# Patient Record
Sex: MALE | Race: WHITE | NOT HISPANIC OR LATINO | Employment: OTHER | URBAN - METROPOLITAN AREA
[De-identification: names, ages, dates, MRNs, and addresses within clinical notes are randomized per-mention and may not be internally consistent; named-entity substitution may affect disease eponyms.]

---

## 2018-01-01 ENCOUNTER — APPOINTMENT (OUTPATIENT)
Dept: ULTRASOUND IMAGING | Facility: HOSPITAL | Age: 83
End: 2018-01-01

## 2018-01-01 ENCOUNTER — HOSPITAL ENCOUNTER (INPATIENT)
Facility: HOSPITAL | Age: 83
LOS: 1 days | Discharge: HOSPICE | End: 2018-11-01
Attending: EMERGENCY MEDICINE | Admitting: INTERNAL MEDICINE

## 2018-01-01 ENCOUNTER — APPOINTMENT (OUTPATIENT)
Dept: CT IMAGING | Facility: HOSPITAL | Age: 83
End: 2018-01-01

## 2018-01-01 ENCOUNTER — APPOINTMENT (OUTPATIENT)
Dept: GENERAL RADIOLOGY | Facility: HOSPITAL | Age: 83
End: 2018-01-01

## 2018-01-01 ENCOUNTER — HOSPITAL ENCOUNTER (INPATIENT)
Facility: HOSPITAL | Age: 83
LOS: 3 days | Discharge: NURSING, REHAB OR LONG-TERM CARE | End: 2018-10-08
Attending: EMERGENCY MEDICINE | Admitting: HOSPITALIST

## 2018-01-01 ENCOUNTER — HOSPITAL ENCOUNTER (INPATIENT)
Facility: HOSPITAL | Age: 83
LOS: 1 days | End: 2018-11-01
Attending: INTERNAL MEDICINE | Admitting: INTERNAL MEDICINE

## 2018-01-01 VITALS
WEIGHT: 108 LBS | TEMPERATURE: 98.9 F | HEART RATE: 103 BPM | DIASTOLIC BLOOD PRESSURE: 56 MMHG | RESPIRATION RATE: 20 BRPM | HEIGHT: 64 IN | BODY MASS INDEX: 18.44 KG/M2 | OXYGEN SATURATION: 88 % | SYSTOLIC BLOOD PRESSURE: 116 MMHG

## 2018-01-01 VITALS
BODY MASS INDEX: 18.51 KG/M2 | DIASTOLIC BLOOD PRESSURE: 81 MMHG | OXYGEN SATURATION: 100 % | TEMPERATURE: 97.9 F | HEART RATE: 97 BPM | HEIGHT: 65 IN | WEIGHT: 111.1 LBS | RESPIRATION RATE: 16 BRPM | SYSTOLIC BLOOD PRESSURE: 121 MMHG
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
## 2018-01-01 DIAGNOSIS — G92.8 TOXIC METABOLIC ENCEPHALOPATHY: ICD-10-CM
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
## 2018-01-01 DIAGNOSIS — I50.33 ACUTE ON CHRONIC DIASTOLIC CONGESTIVE HEART FAILURE (HCC): ICD-10-CM

## 2018-01-01 DIAGNOSIS — D64.9 ANEMIA, UNSPECIFIED TYPE: ICD-10-CM

## 2018-01-01 DIAGNOSIS — Z74.2 CAREGIVER NOT READILY AVAILABLE: ICD-10-CM

## 2018-01-01 DIAGNOSIS — S00.11XA CONTUSION OF RIGHT PERIOCULAR REGION, INITIAL ENCOUNTER: ICD-10-CM

## 2018-01-01 DIAGNOSIS — N39.0 ACUTE UTI: Primary | ICD-10-CM

## 2018-01-01 DIAGNOSIS — C61 PRIMARY PROSTATE CANCER WITH METASTASIS FROM PROSTATE TO OTHER SITE (HCC): ICD-10-CM

## 2018-01-01 DIAGNOSIS — N17.9 ACUTE KIDNEY INJURY (HCC): ICD-10-CM

## 2018-01-01 DIAGNOSIS — I48.91 ATRIAL FIBRILLATION WITH RVR (HCC): Primary | ICD-10-CM

## 2018-01-01 LAB
ALBUMIN SERPL-MCNC: 2.9 G/DL (ref 3.5–5.2)
ALBUMIN SERPL-MCNC: 3 G/DL (ref 3.5–5.2)
ALBUMIN/GLOB SERPL: 0.9 G/DL
ALBUMIN/GLOB SERPL: 0.9 G/DL
ALP SERPL-CCNC: 613 U/L (ref 39–117)
ALP SERPL-CCNC: 685 U/L (ref 39–117)
ALT SERPL W P-5'-P-CCNC: 20 U/L (ref 1–41)
ALT SERPL W P-5'-P-CCNC: 21 U/L (ref 1–41)
ANION GAP SERPL CALCULATED.3IONS-SCNC: 12.5 MMOL/L
ANION GAP SERPL CALCULATED.3IONS-SCNC: 13.4 MMOL/L
ANION GAP SERPL CALCULATED.3IONS-SCNC: 14.9 MMOL/L
ANION GAP SERPL CALCULATED.3IONS-SCNC: 16.1 MMOL/L
ANISOCYTOSIS BLD QL: NORMAL
AST SERPL-CCNC: 56 U/L (ref 1–40)
AST SERPL-CCNC: 61 U/L (ref 1–40)
BACTERIA UR QL AUTO: ABNORMAL /HPF
BACTERIA UR QL AUTO: NORMAL /HPF
BASOPHILS # BLD AUTO: 0.01 10*3/MM3 (ref 0–0.2)
BASOPHILS NFR BLD AUTO: 0.1 % (ref 0–1.5)
BASOPHILS NFR BLD AUTO: 0.2 % (ref 0–1.5)
BILIRUB SERPL-MCNC: 0.5 MG/DL (ref 0.1–1.2)
BILIRUB SERPL-MCNC: 0.6 MG/DL (ref 0.1–1.2)
BILIRUB UR QL STRIP: NEGATIVE
BILIRUB UR QL STRIP: NEGATIVE
BUN BLD-MCNC: 125 MG/DL (ref 8–23)
BUN BLD-MCNC: 56 MG/DL (ref 8–23)
BUN BLD-MCNC: 59 MG/DL (ref 8–23)
BUN BLD-MCNC: 65 MG/DL (ref 8–23)
BUN/CREAT SERPL: 18.9 (ref 7–25)
BUN/CREAT SERPL: 20 (ref 7–25)
BUN/CREAT SERPL: 20.4 (ref 7–25)
BUN/CREAT SERPL: 28.7 (ref 7–25)
CALCIUM SPEC-SCNC: 8.7 MG/DL (ref 8.6–10.5)
CALCIUM SPEC-SCNC: 9 MG/DL (ref 8.6–10.5)
CALCIUM SPEC-SCNC: 9.2 MG/DL (ref 8.6–10.5)
CALCIUM SPEC-SCNC: 9.3 MG/DL (ref 8.6–10.5)
CHLORIDE SERPL-SCNC: 101 MMOL/L (ref 98–107)
CHLORIDE SERPL-SCNC: 102 MMOL/L (ref 98–107)
CHLORIDE SERPL-SCNC: 104 MMOL/L (ref 98–107)
CHLORIDE SERPL-SCNC: 105 MMOL/L (ref 98–107)
CK SERPL-CCNC: 49 U/L (ref 20–200)
CLARITY UR: ABNORMAL
CLARITY UR: CLEAR
CO2 SERPL-SCNC: 17.9 MMOL/L (ref 22–29)
CO2 SERPL-SCNC: 20.1 MMOL/L (ref 22–29)
CO2 SERPL-SCNC: 21.5 MMOL/L (ref 22–29)
CO2 SERPL-SCNC: 21.6 MMOL/L (ref 22–29)
COLOR UR: ABNORMAL
COLOR UR: YELLOW
CREAT BLD-MCNC: 2.95 MG/DL (ref 0.76–1.27)
CREAT BLD-MCNC: 2.96 MG/DL (ref 0.76–1.27)
CREAT BLD-MCNC: 3.19 MG/DL (ref 0.76–1.27)
CREAT BLD-MCNC: 4.36 MG/DL (ref 0.76–1.27)
DEPRECATED RDW RBC AUTO: 53.2 FL (ref 37–54)
DEPRECATED RDW RBC AUTO: 53.5 FL (ref 37–54)
DEPRECATED RDW RBC AUTO: 53.8 FL (ref 37–54)
DEPRECATED RDW RBC AUTO: 55.6 FL (ref 37–54)
EOSINOPHIL # BLD AUTO: 0.01 10*3/MM3 (ref 0–0.7)
EOSINOPHIL # BLD AUTO: 0.11 10*3/MM3 (ref 0–0.7)
EOSINOPHIL # BLD AUTO: 0.16 10*3/MM3 (ref 0–0.7)
EOSINOPHIL # BLD AUTO: 0.16 10*3/MM3 (ref 0–0.7)
EOSINOPHIL NFR BLD AUTO: 0.1 % (ref 0.3–6.2)
EOSINOPHIL NFR BLD AUTO: 2.1 % (ref 0.3–6.2)
EOSINOPHIL NFR BLD AUTO: 3.6 % (ref 0.3–6.2)
EOSINOPHIL NFR BLD AUTO: 3.6 % (ref 0.3–6.2)
ERYTHROCYTE [DISTWIDTH] IN BLOOD BY AUTOMATED COUNT: 16.4 % (ref 11.5–14.5)
ERYTHROCYTE [DISTWIDTH] IN BLOOD BY AUTOMATED COUNT: 16.5 % (ref 11.5–14.5)
ERYTHROCYTE [DISTWIDTH] IN BLOOD BY AUTOMATED COUNT: 16.6 % (ref 11.5–14.5)
ERYTHROCYTE [DISTWIDTH] IN BLOOD BY AUTOMATED COUNT: 18.2 % (ref 11.5–14.5)
GFR SERPL CREATININE-BSD FRML MDRD: 13 ML/MIN/1.73
GFR SERPL CREATININE-BSD FRML MDRD: 18 ML/MIN/1.73
GFR SERPL CREATININE-BSD FRML MDRD: 20 ML/MIN/1.73
GFR SERPL CREATININE-BSD FRML MDRD: 20 ML/MIN/1.73
GFR SERPL CREATININE-BSD FRML MDRD: ABNORMAL ML/MIN/1.73
GLOBULIN UR ELPH-MCNC: 3.2 GM/DL
GLOBULIN UR ELPH-MCNC: 3.3 GM/DL
GLUCOSE BLD-MCNC: 123 MG/DL (ref 65–99)
GLUCOSE BLD-MCNC: 144 MG/DL (ref 65–99)
GLUCOSE BLD-MCNC: 83 MG/DL (ref 65–99)
GLUCOSE BLD-MCNC: 90 MG/DL (ref 65–99)
GLUCOSE BLDC GLUCOMTR-MCNC: 101 MG/DL (ref 70–130)
GLUCOSE UR STRIP-MCNC: NEGATIVE MG/DL
GLUCOSE UR STRIP-MCNC: NEGATIVE MG/DL
HBA1C MFR BLD: 5.8 % (ref 4.8–5.6)
HCT VFR BLD AUTO: 26.1 % (ref 40.4–52.2)
HCT VFR BLD AUTO: 26.8 % (ref 40.4–52.2)
HCT VFR BLD AUTO: 27.8 % (ref 40.4–52.2)
HCT VFR BLD AUTO: 28.5 % (ref 40.4–52.2)
HGB BLD-MCNC: 8.2 G/DL (ref 13.7–17.6)
HGB BLD-MCNC: 8.3 G/DL (ref 13.7–17.6)
HGB BLD-MCNC: 8.8 G/DL (ref 13.7–17.6)
HGB BLD-MCNC: 9.1 G/DL (ref 13.7–17.6)
HGB UR QL STRIP.AUTO: ABNORMAL
HGB UR QL STRIP.AUTO: NEGATIVE
HOLD SPECIMEN: NORMAL
HYALINE CASTS UR QL AUTO: ABNORMAL /LPF
HYALINE CASTS UR QL AUTO: NORMAL /LPF
IMM GRANULOCYTES # BLD: 0.04 10*3/MM3 (ref 0–0.03)
IMM GRANULOCYTES # BLD: 0.05 10*3/MM3 (ref 0–0.03)
IMM GRANULOCYTES # BLD: 0.06 10*3/MM3 (ref 0–0.03)
IMM GRANULOCYTES # BLD: 0.06 10*3/MM3 (ref 0–0.03)
IMM GRANULOCYTES NFR BLD: 0.8 % (ref 0–0.5)
IMM GRANULOCYTES NFR BLD: 0.9 % (ref 0–0.5)
IMM GRANULOCYTES NFR BLD: 1.1 % (ref 0–0.5)
IMM GRANULOCYTES NFR BLD: 1.4 % (ref 0–0.5)
INR PPP: 1.66 (ref 0.9–1.1)
KETONES UR QL STRIP: ABNORMAL
KETONES UR QL STRIP: NEGATIVE
LEUKOCYTE ESTERASE UR QL STRIP.AUTO: ABNORMAL
LEUKOCYTE ESTERASE UR QL STRIP.AUTO: NEGATIVE
LYMPHOCYTES # BLD AUTO: 0.42 10*3/MM3 (ref 0.9–4.8)
LYMPHOCYTES # BLD AUTO: 0.51 10*3/MM3 (ref 0.9–4.8)
LYMPHOCYTES # BLD AUTO: 0.56 10*3/MM3 (ref 0.9–4.8)
LYMPHOCYTES # BLD AUTO: 0.59 10*3/MM3 (ref 0.9–4.8)
LYMPHOCYTES NFR BLD AUTO: 11.2 % (ref 19.6–45.3)
LYMPHOCYTES NFR BLD AUTO: 11.5 % (ref 19.6–45.3)
LYMPHOCYTES NFR BLD AUTO: 12.7 % (ref 19.6–45.3)
LYMPHOCYTES NFR BLD AUTO: 6.2 % (ref 19.6–45.3)
MAGNESIUM SERPL-MCNC: 2 MG/DL (ref 1.6–2.4)
MCH RBC QN AUTO: 27.8 PG (ref 27–32.7)
MCH RBC QN AUTO: 28.2 PG (ref 27–32.7)
MCH RBC QN AUTO: 28.2 PG (ref 27–32.7)
MCH RBC QN AUTO: 28.4 PG (ref 27–32.7)
MCHC RBC AUTO-ENTMCNC: 31 G/DL (ref 32.6–36.4)
MCHC RBC AUTO-ENTMCNC: 31.4 G/DL (ref 32.6–36.4)
MCHC RBC AUTO-ENTMCNC: 31.7 G/DL (ref 32.6–36.4)
MCHC RBC AUTO-ENTMCNC: 31.9 G/DL (ref 32.6–36.4)
MCV RBC AUTO: 88.2 FL (ref 79.8–96.2)
MCV RBC AUTO: 89.6 FL (ref 79.8–96.2)
MCV RBC AUTO: 89.7 FL (ref 79.8–96.2)
MCV RBC AUTO: 89.7 FL (ref 79.8–96.2)
MONOCYTES # BLD AUTO: 0.5 10*3/MM3 (ref 0.2–1.2)
MONOCYTES # BLD AUTO: 0.55 10*3/MM3 (ref 0.2–1.2)
MONOCYTES # BLD AUTO: 0.57 10*3/MM3 (ref 0.2–1.2)
MONOCYTES # BLD AUTO: 0.61 10*3/MM3 (ref 0.2–1.2)
MONOCYTES NFR BLD AUTO: 11.6 % (ref 5–12)
MONOCYTES NFR BLD AUTO: 12.4 % (ref 5–12)
MONOCYTES NFR BLD AUTO: 13 % (ref 5–12)
MONOCYTES NFR BLD AUTO: 7.4 % (ref 5–12)
NEUTROPHILS # BLD AUTO: 3.05 10*3/MM3 (ref 1.9–8.1)
NEUTROPHILS # BLD AUTO: 3.2 10*3/MM3 (ref 1.9–8.1)
NEUTROPHILS # BLD AUTO: 3.94 10*3/MM3 (ref 1.9–8.1)
NEUTROPHILS # BLD AUTO: 5.83 10*3/MM3 (ref 1.9–8.1)
NEUTROPHILS NFR BLD AUTO: 69.4 % (ref 42.7–76)
NEUTROPHILS NFR BLD AUTO: 72.3 % (ref 42.7–76)
NEUTROPHILS NFR BLD AUTO: 74.9 % (ref 42.7–76)
NEUTROPHILS NFR BLD AUTO: 86.2 % (ref 42.7–76)
NITRITE UR QL STRIP: NEGATIVE
NITRITE UR QL STRIP: POSITIVE
NRBC BLD MANUAL-RTO: 0 /100 WBC (ref 0–0)
NRBC BLD MANUAL-RTO: 0 /100 WBC (ref 0–0)
NRBC BLD MANUAL-RTO: 0.7 /100 WBC (ref 0–0)
NT-PROBNP SERPL-MCNC: ABNORMAL PG/ML (ref 0–1800)
PH UR STRIP.AUTO: 6 [PH] (ref 5–8)
PH UR STRIP.AUTO: <=5 [PH] (ref 5–8)
PLAT MORPH BLD: NORMAL
PLATELET # BLD AUTO: 46 10*3/MM3 (ref 140–500)
PLATELET # BLD AUTO: 53 10*3/MM3 (ref 140–500)
PLATELET # BLD AUTO: 61 10*3/MM3 (ref 140–500)
PLATELET # BLD AUTO: 65 10*3/MM3 (ref 140–500)
PMV BLD AUTO: 12.9 FL (ref 6–12)
PMV BLD AUTO: ABNORMAL FL (ref 6–12)
POTASSIUM BLD-SCNC: 5 MMOL/L (ref 3.5–5.2)
POTASSIUM BLD-SCNC: 5.1 MMOL/L (ref 3.5–5.2)
POTASSIUM BLD-SCNC: 5.3 MMOL/L (ref 3.5–5.2)
POTASSIUM BLD-SCNC: 5.6 MMOL/L (ref 3.5–5.2)
PROT SERPL-MCNC: 6.2 G/DL (ref 6–8.5)
PROT SERPL-MCNC: 6.2 G/DL (ref 6–8.5)
PROT UR QL STRIP: ABNORMAL
PROT UR QL STRIP: ABNORMAL
PROTHROMBIN TIME: 19.3 SECONDS (ref 11.7–14.2)
RBC # BLD AUTO: 2.91 10*6/MM3 (ref 4.6–6)
RBC # BLD AUTO: 2.99 10*6/MM3 (ref 4.6–6)
RBC # BLD AUTO: 3.1 10*6/MM3 (ref 4.6–6)
RBC # BLD AUTO: 3.23 10*6/MM3 (ref 4.6–6)
RBC # UR: ABNORMAL /HPF
RBC # UR: NORMAL /HPF
REF LAB TEST METHOD: ABNORMAL
REF LAB TEST METHOD: NORMAL
SODIUM BLD-SCNC: 136 MMOL/L (ref 136–145)
SODIUM BLD-SCNC: 136 MMOL/L (ref 136–145)
SODIUM BLD-SCNC: 139 MMOL/L (ref 136–145)
SODIUM BLD-SCNC: 139 MMOL/L (ref 136–145)
SP GR UR STRIP: 1.02 (ref 1–1.03)
SP GR UR STRIP: 1.02 (ref 1–1.03)
SQUAMOUS #/AREA URNS HPF: ABNORMAL /HPF
SQUAMOUS #/AREA URNS HPF: NORMAL /HPF
TROPONIN T SERPL-MCNC: 0.05 NG/ML (ref 0–0.03)
TROPONIN T SERPL-MCNC: 0.07 NG/ML (ref 0–0.03)
TSH SERPL DL<=0.05 MIU/L-ACNC: 1.21 MIU/ML (ref 0.27–4.2)
UROBILINOGEN UR QL STRIP: ABNORMAL
UROBILINOGEN UR QL STRIP: ABNORMAL
VIT B12 BLD-MCNC: 1472 PG/ML (ref 211–946)
WBC MORPH BLD: NORMAL
WBC NRBC COR # BLD: 4.4 10*3/MM3 (ref 4.5–10.7)
WBC NRBC COR # BLD: 4.43 10*3/MM3 (ref 4.5–10.7)
WBC NRBC COR # BLD: 5.26 10*3/MM3 (ref 4.5–10.7)
WBC NRBC COR # BLD: 6.77 10*3/MM3 (ref 4.5–10.7)
WBC UR QL AUTO: ABNORMAL /HPF
WBC UR QL AUTO: NORMAL /HPF
WHOLE BLOOD HOLD SPECIMEN: NORMAL

## 2018-01-01 PROCEDURE — 85025 COMPLETE CBC W/AUTO DIFF WBC: CPT | Performed by: EMERGENCY MEDICINE

## 2018-01-01 PROCEDURE — 25010000002 FUROSEMIDE PER 20 MG: Performed by: EMERGENCY MEDICINE

## 2018-01-01 PROCEDURE — 84484 ASSAY OF TROPONIN QUANT: CPT | Performed by: PHYSICIAN ASSISTANT

## 2018-01-01 PROCEDURE — 25010000002 LORAZEPAM PER 2 MG: Performed by: INTERNAL MEDICINE

## 2018-01-01 PROCEDURE — 25010000002 MORPHINE PER 10 MG: Performed by: INTERNAL MEDICINE

## 2018-01-01 PROCEDURE — 76775 US EXAM ABDO BACK WALL LIM: CPT

## 2018-01-01 PROCEDURE — G0378 HOSPITAL OBSERVATION PER HR: HCPCS

## 2018-01-01 PROCEDURE — 83880 ASSAY OF NATRIURETIC PEPTIDE: CPT | Performed by: EMERGENCY MEDICINE

## 2018-01-01 PROCEDURE — 81001 URINALYSIS AUTO W/SCOPE: CPT | Performed by: EMERGENCY MEDICINE

## 2018-01-01 PROCEDURE — 94799 UNLISTED PULMONARY SVC/PX: CPT

## 2018-01-01 PROCEDURE — 83735 ASSAY OF MAGNESIUM: CPT | Performed by: PHYSICIAN ASSISTANT

## 2018-01-01 PROCEDURE — 85007 BL SMEAR W/DIFF WBC COUNT: CPT | Performed by: PHYSICIAN ASSISTANT

## 2018-01-01 PROCEDURE — 80048 BASIC METABOLIC PNL TOTAL CA: CPT | Performed by: HOSPITALIST

## 2018-01-01 PROCEDURE — 87077 CULTURE AEROBIC IDENTIFY: CPT | Performed by: EMERGENCY MEDICINE

## 2018-01-01 PROCEDURE — 25010000002 MORPHINE PER 10 MG: Performed by: HOSPITALIST

## 2018-01-01 PROCEDURE — 97110 THERAPEUTIC EXERCISES: CPT

## 2018-01-01 PROCEDURE — 85025 COMPLETE CBC W/AUTO DIFF WBC: CPT | Performed by: INTERNAL MEDICINE

## 2018-01-01 PROCEDURE — 83036 HEMOGLOBIN GLYCOSYLATED A1C: CPT | Performed by: INTERNAL MEDICINE

## 2018-01-01 PROCEDURE — 93010 ELECTROCARDIOGRAM REPORT: CPT | Performed by: INTERNAL MEDICINE

## 2018-01-01 PROCEDURE — 87186 SC STD MICRODIL/AGAR DIL: CPT | Performed by: EMERGENCY MEDICINE

## 2018-01-01 PROCEDURE — 97162 PT EVAL MOD COMPLEX 30 MIN: CPT | Performed by: PHYSICAL THERAPIST

## 2018-01-01 PROCEDURE — 93005 ELECTROCARDIOGRAM TRACING: CPT | Performed by: PHYSICIAN ASSISTANT

## 2018-01-01 PROCEDURE — 84443 ASSAY THYROID STIM HORMONE: CPT | Performed by: INTERNAL MEDICINE

## 2018-01-01 PROCEDURE — 84484 ASSAY OF TROPONIN QUANT: CPT | Performed by: INTERNAL MEDICINE

## 2018-01-01 PROCEDURE — 25010000002 HYDROMORPHONE 1 MG/ML SOLUTION: Performed by: INTERNAL MEDICINE

## 2018-01-01 PROCEDURE — 74176 CT ABD & PELVIS W/O CONTRAST: CPT

## 2018-01-01 PROCEDURE — P9612 CATHETERIZE FOR URINE SPEC: HCPCS

## 2018-01-01 PROCEDURE — 99285 EMERGENCY DEPT VISIT HI MDM: CPT

## 2018-01-01 PROCEDURE — 82607 VITAMIN B-12: CPT | Performed by: INTERNAL MEDICINE

## 2018-01-01 PROCEDURE — 80053 COMPREHEN METABOLIC PANEL: CPT | Performed by: EMERGENCY MEDICINE

## 2018-01-01 PROCEDURE — 85610 PROTHROMBIN TIME: CPT | Performed by: PHYSICIAN ASSISTANT

## 2018-01-01 PROCEDURE — 80053 COMPREHEN METABOLIC PANEL: CPT | Performed by: PHYSICIAN ASSISTANT

## 2018-01-01 PROCEDURE — 85025 COMPLETE CBC W/AUTO DIFF WBC: CPT | Performed by: HOSPITALIST

## 2018-01-01 PROCEDURE — 25010000002 CEFTRIAXONE PER 250 MG: Performed by: EMERGENCY MEDICINE

## 2018-01-01 PROCEDURE — 82550 ASSAY OF CK (CPK): CPT | Performed by: PHYSICIAN ASSISTANT

## 2018-01-01 PROCEDURE — 87086 URINE CULTURE/COLONY COUNT: CPT | Performed by: EMERGENCY MEDICINE

## 2018-01-01 PROCEDURE — 85025 COMPLETE CBC W/AUTO DIFF WBC: CPT | Performed by: PHYSICIAN ASSISTANT

## 2018-01-01 PROCEDURE — 25010000002 MORPHINE PER 10 MG

## 2018-01-01 PROCEDURE — 71046 X-RAY EXAM CHEST 2 VIEWS: CPT

## 2018-01-01 PROCEDURE — 81001 URINALYSIS AUTO W/SCOPE: CPT | Performed by: PHYSICIAN ASSISTANT

## 2018-01-01 PROCEDURE — 99284 EMERGENCY DEPT VISIT MOD MDM: CPT

## 2018-01-01 PROCEDURE — 82962 GLUCOSE BLOOD TEST: CPT

## 2018-01-01 PROCEDURE — 70450 CT HEAD/BRAIN W/O DYE: CPT

## 2018-01-01 PROCEDURE — 80048 BASIC METABOLIC PNL TOTAL CA: CPT | Performed by: INTERNAL MEDICINE

## 2018-01-01 PROCEDURE — 97116 GAIT TRAINING THERAPY: CPT | Performed by: PHYSICAL THERAPIST

## 2018-01-01 PROCEDURE — 25010000002 CEFTRIAXONE PER 250 MG: Performed by: HOSPITALIST

## 2018-01-01 RX ORDER — MORPHINE SULFATE 2 MG/ML
2 INJECTION, SOLUTION INTRAMUSCULAR; INTRAVENOUS
Status: DISCONTINUED | OUTPATIENT
Start: 2018-01-01 | End: 2018-01-01 | Stop reason: HOSPADM

## 2018-01-01 RX ORDER — HALOPERIDOL 1 MG/1
0.5 TABLET ORAL 4 TIMES DAILY
Status: DISCONTINUED | OUTPATIENT
Start: 2018-01-01 | End: 2018-01-01 | Stop reason: HOSPADM

## 2018-01-01 RX ORDER — PROMETHAZINE HYDROCHLORIDE 25 MG/1
6.25 TABLET ORAL EVERY 4 HOURS PRN
Status: DISCONTINUED | OUTPATIENT
Start: 2018-01-01 | End: 2018-01-01 | Stop reason: HOSPADM

## 2018-01-01 RX ORDER — PROMETHAZINE HYDROCHLORIDE 25 MG/ML
12.5 INJECTION, SOLUTION INTRAMUSCULAR; INTRAVENOUS EVERY 4 HOURS PRN
Status: DISCONTINUED | OUTPATIENT
Start: 2018-01-01 | End: 2018-01-01 | Stop reason: HOSPADM

## 2018-01-01 RX ORDER — ATENOLOL 25 MG/1
25 TABLET ORAL DAILY
COMMUNITY

## 2018-01-01 RX ORDER — GLYCOPYRROLATE 0.2 MG/ML
0.2 INJECTION INTRAMUSCULAR; INTRAVENOUS
Status: DISCONTINUED | OUTPATIENT
Start: 2018-01-01 | End: 2018-01-01 | Stop reason: HOSPADM

## 2018-01-01 RX ORDER — SODIUM CHLORIDE 9 MG/ML
100 INJECTION, SOLUTION INTRAVENOUS CONTINUOUS
Status: DISCONTINUED | OUTPATIENT
Start: 2018-01-01 | End: 2018-01-01 | Stop reason: HOSPADM

## 2018-01-01 RX ORDER — ONDANSETRON 4 MG/1
4 TABLET, ORALLY DISINTEGRATING ORAL EVERY 6 HOURS PRN
Status: DISCONTINUED | OUTPATIENT
Start: 2018-01-01 | End: 2018-01-01

## 2018-01-01 RX ORDER — ALPRAZOLAM 0.25 MG/1
0.25 TABLET ORAL 2 TIMES DAILY PRN
Status: DISCONTINUED | OUTPATIENT
Start: 2018-01-01 | End: 2018-01-01

## 2018-01-01 RX ORDER — SODIUM CHLORIDE 0.9 % (FLUSH) 0.9 %
1-10 SYRINGE (ML) INJECTION AS NEEDED
Status: CANCELLED | OUTPATIENT
Start: 2018-01-01

## 2018-01-01 RX ORDER — SODIUM CHLORIDE 9 MG/ML
75 INJECTION, SOLUTION INTRAVENOUS CONTINUOUS
Status: DISCONTINUED | OUTPATIENT
Start: 2018-01-01 | End: 2018-01-01

## 2018-01-01 RX ORDER — SENNA AND DOCUSATE SODIUM 50; 8.6 MG/1; MG/1
2 TABLET, FILM COATED ORAL NIGHTLY
Status: DISCONTINUED | OUTPATIENT
Start: 2018-01-01 | End: 2018-01-01 | Stop reason: HOSPADM

## 2018-01-01 RX ORDER — PROMETHAZINE HYDROCHLORIDE 6.25 MG/5ML
6.25 SYRUP ORAL EVERY 4 HOURS PRN
Status: DISCONTINUED | OUTPATIENT
Start: 2018-01-01 | End: 2018-01-01 | Stop reason: HOSPADM

## 2018-01-01 RX ORDER — SCOLOPAMINE TRANSDERMAL SYSTEM 1 MG/1
1 PATCH, EXTENDED RELEASE TRANSDERMAL
Status: CANCELLED | OUTPATIENT
Start: 2018-01-01

## 2018-01-01 RX ORDER — ONDANSETRON 4 MG/1
4 TABLET, FILM COATED ORAL EVERY 6 HOURS PRN
COMMUNITY

## 2018-01-01 RX ORDER — BICALUTAMIDE 50 MG/1
50 TABLET, FILM COATED ORAL DAILY
Status: DISCONTINUED | OUTPATIENT
Start: 2018-01-01 | End: 2018-01-01 | Stop reason: HOSPADM

## 2018-01-01 RX ORDER — SODIUM CHLORIDE 0.9 % (FLUSH) 0.9 %
3 SYRINGE (ML) INJECTION EVERY 12 HOURS SCHEDULED
Status: CANCELLED | OUTPATIENT
Start: 2018-01-01

## 2018-01-01 RX ORDER — ONDANSETRON 4 MG/1
4 TABLET, FILM COATED ORAL EVERY 6 HOURS PRN
Status: DISCONTINUED | OUTPATIENT
Start: 2018-01-01 | End: 2018-01-01 | Stop reason: HOSPADM

## 2018-01-01 RX ORDER — PROMETHAZINE HYDROCHLORIDE 25 MG/ML
6.25 INJECTION, SOLUTION INTRAMUSCULAR; INTRAVENOUS EVERY 4 HOURS PRN
Status: CANCELLED | OUTPATIENT
Start: 2018-01-01

## 2018-01-01 RX ORDER — ACETAMINOPHEN 650 MG/1
650 SUPPOSITORY RECTAL EVERY 4 HOURS PRN
Status: CANCELLED | OUTPATIENT
Start: 2018-01-01

## 2018-01-01 RX ORDER — GLYCOPYRROLATE 0.2 MG/ML
0.2 INJECTION INTRAMUSCULAR; INTRAVENOUS
Status: CANCELLED | OUTPATIENT
Start: 2018-01-01

## 2018-01-01 RX ORDER — AMLODIPINE BESYLATE 5 MG/1
10 TABLET ORAL DAILY
Status: DISCONTINUED | OUTPATIENT
Start: 2018-01-01 | End: 2018-01-01 | Stop reason: HOSPADM

## 2018-01-01 RX ORDER — SODIUM CHLORIDE 0.9 % (FLUSH) 0.9 %
3 SYRINGE (ML) INJECTION EVERY 12 HOURS SCHEDULED
Status: DISCONTINUED | OUTPATIENT
Start: 2018-01-01 | End: 2018-01-01 | Stop reason: HOSPADM

## 2018-01-01 RX ORDER — GLYCOPYRROLATE 0.2 MG/ML
0.4 INJECTION INTRAMUSCULAR; INTRAVENOUS
Status: CANCELLED | OUTPATIENT
Start: 2018-01-01

## 2018-01-01 RX ORDER — ALPRAZOLAM 0.25 MG/1
0.25 TABLET ORAL 2 TIMES DAILY PRN
Qty: 10 TABLET | Refills: 0 | Status: SHIPPED | OUTPATIENT
Start: 2018-01-01

## 2018-01-01 RX ORDER — LORAZEPAM 2 MG/ML
1 INJECTION INTRAMUSCULAR
Status: DISCONTINUED | OUTPATIENT
Start: 2018-01-01 | End: 2018-01-01 | Stop reason: HOSPADM

## 2018-01-01 RX ORDER — ACETAMINOPHEN 160 MG/5ML
650 SOLUTION ORAL EVERY 4 HOURS PRN
Status: DISCONTINUED | OUTPATIENT
Start: 2018-01-01 | End: 2018-01-01 | Stop reason: HOSPADM

## 2018-01-01 RX ORDER — LORAZEPAM 2 MG/ML
1 INJECTION INTRAMUSCULAR
Status: CANCELLED | OUTPATIENT
Start: 2018-01-01 | End: 2018-11-11

## 2018-01-01 RX ORDER — CEFTRIAXONE SODIUM 1 G/50ML
1 INJECTION, SOLUTION INTRAVENOUS ONCE
Status: COMPLETED | OUTPATIENT
Start: 2018-01-01 | End: 2018-01-01

## 2018-01-01 RX ORDER — BICALUTAMIDE 50 MG/1
50 TABLET, FILM COATED ORAL DAILY
COMMUNITY

## 2018-01-01 RX ORDER — ONDANSETRON 2 MG/ML
4 INJECTION INTRAMUSCULAR; INTRAVENOUS EVERY 6 HOURS PRN
Status: DISCONTINUED | OUTPATIENT
Start: 2018-01-01 | End: 2018-01-01 | Stop reason: HOSPADM

## 2018-01-01 RX ORDER — HYDROCODONE BITARTRATE AND ACETAMINOPHEN 5; 325 MG/1; MG/1
1 TABLET ORAL EVERY 4 HOURS PRN
Status: ON HOLD | COMMUNITY
End: 2018-01-01

## 2018-01-01 RX ORDER — HYDRALAZINE HYDROCHLORIDE 25 MG/1
25 TABLET, FILM COATED ORAL 2 TIMES DAILY
COMMUNITY

## 2018-01-01 RX ORDER — ACETAMINOPHEN 160 MG/5ML
650 SOLUTION ORAL EVERY 4 HOURS PRN
Status: CANCELLED | OUTPATIENT
Start: 2018-01-01

## 2018-01-01 RX ORDER — SODIUM CHLORIDE 0.9 % (FLUSH) 0.9 %
10 SYRINGE (ML) INJECTION AS NEEDED
Status: CANCELLED | OUTPATIENT
Start: 2018-01-01

## 2018-01-01 RX ORDER — BICALUTAMIDE 50 MG/1
50 TABLET, FILM COATED ORAL DAILY
Status: DISCONTINUED | OUTPATIENT
Start: 2018-01-01 | End: 2018-01-01

## 2018-01-01 RX ORDER — DIPHENOXYLATE HYDROCHLORIDE AND ATROPINE SULFATE 2.5; .025 MG/1; MG/1
1 TABLET ORAL
Status: CANCELLED | OUTPATIENT
Start: 2018-01-01

## 2018-01-01 RX ORDER — CEFTRIAXONE SODIUM 1 G/50ML
1 INJECTION, SOLUTION INTRAVENOUS EVERY 24 HOURS
Status: DISCONTINUED | OUTPATIENT
Start: 2018-01-01 | End: 2018-01-01

## 2018-01-01 RX ORDER — SODIUM CHLORIDE 0.9 % (FLUSH) 0.9 %
10 SYRINGE (ML) INJECTION AS NEEDED
Status: DISCONTINUED | OUTPATIENT
Start: 2018-01-01 | End: 2018-01-01

## 2018-01-01 RX ORDER — MORPHINE SULFATE 20 MG/ML
5 SOLUTION ORAL
Status: DISCONTINUED | OUTPATIENT
Start: 2018-01-01 | End: 2018-01-01 | Stop reason: HOSPADM

## 2018-01-01 RX ORDER — PROMETHAZINE HYDROCHLORIDE 25 MG/1
12.5 TABLET ORAL EVERY 4 HOURS PRN
Status: DISCONTINUED | OUTPATIENT
Start: 2018-01-01 | End: 2018-01-01 | Stop reason: HOSPADM

## 2018-01-01 RX ORDER — LORAZEPAM 2 MG/ML
2 CONCENTRATE ORAL
Status: CANCELLED | OUTPATIENT
Start: 2018-01-01 | End: 2018-11-11

## 2018-01-01 RX ORDER — PROMETHAZINE HYDROCHLORIDE 12.5 MG/1
12.5 SUPPOSITORY RECTAL EVERY 4 HOURS PRN
Status: DISCONTINUED | OUTPATIENT
Start: 2018-01-01 | End: 2018-01-01 | Stop reason: HOSPADM

## 2018-01-01 RX ORDER — MORPHINE SULFATE 2 MG/ML
2 INJECTION, SOLUTION INTRAMUSCULAR; INTRAVENOUS
Status: CANCELLED | OUTPATIENT
Start: 2018-01-01 | End: 2018-11-11

## 2018-01-01 RX ORDER — LORAZEPAM 2 MG/ML
0.5 INJECTION INTRAMUSCULAR
Status: DISCONTINUED | OUTPATIENT
Start: 2018-01-01 | End: 2018-01-01 | Stop reason: HOSPADM

## 2018-01-01 RX ORDER — MORPHINE SULFATE 2 MG/ML
4 INJECTION, SOLUTION INTRAMUSCULAR; INTRAVENOUS
Status: DISCONTINUED | OUTPATIENT
Start: 2018-01-01 | End: 2018-01-01

## 2018-01-01 RX ORDER — LORAZEPAM 2 MG/ML
1 CONCENTRATE ORAL
Status: DISCONTINUED | OUTPATIENT
Start: 2018-01-01 | End: 2018-01-01 | Stop reason: HOSPADM

## 2018-01-01 RX ORDER — ONDANSETRON 4 MG/1
4 TABLET, ORALLY DISINTEGRATING ORAL EVERY 6 HOURS PRN
Status: DISCONTINUED | OUTPATIENT
Start: 2018-01-01 | End: 2018-01-01 | Stop reason: HOSPADM

## 2018-01-01 RX ORDER — MORPHINE SULFATE 10 MG/ML
4 INJECTION INTRAMUSCULAR; INTRAVENOUS; SUBCUTANEOUS
Status: DISCONTINUED | OUTPATIENT
Start: 2018-01-01 | End: 2018-01-01 | Stop reason: HOSPADM

## 2018-01-01 RX ORDER — FUROSEMIDE 10 MG/ML
40 INJECTION INTRAMUSCULAR; INTRAVENOUS EVERY 12 HOURS
Status: DISCONTINUED | OUTPATIENT
Start: 2018-01-01 | End: 2018-01-01

## 2018-01-01 RX ORDER — DIPHENOXYLATE HYDROCHLORIDE AND ATROPINE SULFATE 2.5; .025 MG/1; MG/1
1 TABLET ORAL
Status: DISCONTINUED | OUTPATIENT
Start: 2018-01-01 | End: 2018-01-01 | Stop reason: HOSPADM

## 2018-01-01 RX ORDER — PROMETHAZINE HYDROCHLORIDE 25 MG/ML
12.5 INJECTION, SOLUTION INTRAMUSCULAR; INTRAVENOUS EVERY 4 HOURS PRN
Status: CANCELLED | OUTPATIENT
Start: 2018-01-01

## 2018-01-01 RX ORDER — MORPHINE SULFATE 2 MG/ML
INJECTION, SOLUTION INTRAMUSCULAR; INTRAVENOUS
Status: COMPLETED
Start: 2018-01-01 | End: 2018-01-01

## 2018-01-01 RX ORDER — HYDRALAZINE HYDROCHLORIDE 25 MG/1
25 TABLET, FILM COATED ORAL 2 TIMES DAILY
Status: DISCONTINUED | OUTPATIENT
Start: 2018-01-01 | End: 2018-01-01 | Stop reason: HOSPADM

## 2018-01-01 RX ORDER — LORAZEPAM 2 MG/ML
0.5 CONCENTRATE ORAL
Status: CANCELLED | OUTPATIENT
Start: 2018-01-01 | End: 2018-11-11

## 2018-01-01 RX ORDER — ACETAMINOPHEN 325 MG/1
650 TABLET ORAL EVERY 4 HOURS PRN
Status: DISCONTINUED | OUTPATIENT
Start: 2018-01-01 | End: 2018-01-01 | Stop reason: HOSPADM

## 2018-01-01 RX ORDER — GLYCOPYRROLATE 0.2 MG/ML
0.4 INJECTION INTRAMUSCULAR; INTRAVENOUS
Status: DISCONTINUED | OUTPATIENT
Start: 2018-01-01 | End: 2018-01-01 | Stop reason: HOSPADM

## 2018-01-01 RX ORDER — LORAZEPAM 2 MG/ML
0.5 CONCENTRATE ORAL
Status: DISCONTINUED | OUTPATIENT
Start: 2018-01-01 | End: 2018-01-01 | Stop reason: HOSPADM

## 2018-01-01 RX ORDER — PROMETHAZINE HYDROCHLORIDE 6.25 MG/5ML
6.25 SYRUP ORAL EVERY 4 HOURS PRN
Status: CANCELLED | OUTPATIENT
Start: 2018-01-01

## 2018-01-01 RX ORDER — ONDANSETRON 4 MG/1
4 TABLET, FILM COATED ORAL EVERY 6 HOURS PRN
Status: DISCONTINUED | OUTPATIENT
Start: 2018-01-01 | End: 2018-01-01

## 2018-01-01 RX ORDER — PROMETHAZINE HYDROCHLORIDE 25 MG/ML
6.25 INJECTION, SOLUTION INTRAMUSCULAR; INTRAVENOUS EVERY 4 HOURS PRN
Status: DISCONTINUED | OUTPATIENT
Start: 2018-01-01 | End: 2018-01-01 | Stop reason: HOSPADM

## 2018-01-01 RX ORDER — FUROSEMIDE 10 MG/ML
60 INJECTION INTRAMUSCULAR; INTRAVENOUS ONCE
Status: COMPLETED | OUTPATIENT
Start: 2018-01-01 | End: 2018-01-01

## 2018-01-01 RX ORDER — ATENOLOL 25 MG/1
25 TABLET ORAL DAILY
Status: DISCONTINUED | OUTPATIENT
Start: 2018-01-01 | End: 2018-01-01 | Stop reason: HOSPADM

## 2018-01-01 RX ORDER — LORAZEPAM 2 MG/ML
1 CONCENTRATE ORAL
Status: CANCELLED | OUTPATIENT
Start: 2018-01-01 | End: 2018-11-11

## 2018-01-01 RX ORDER — ACETAMINOPHEN 325 MG/1
650 TABLET ORAL EVERY 4 HOURS PRN
Status: DISCONTINUED | OUTPATIENT
Start: 2018-01-01 | End: 2018-01-01

## 2018-01-01 RX ORDER — SODIUM CHLORIDE 0.9 % (FLUSH) 0.9 %
10 SYRINGE (ML) INJECTION AS NEEDED
Status: DISCONTINUED | OUTPATIENT
Start: 2018-01-01 | End: 2018-01-01 | Stop reason: HOSPADM

## 2018-01-01 RX ORDER — MORPHINE SULFATE 10 MG/ML
4 INJECTION INTRAMUSCULAR; INTRAVENOUS; SUBCUTANEOUS
Status: CANCELLED | OUTPATIENT
Start: 2018-01-01 | End: 2018-11-11

## 2018-01-01 RX ORDER — SENNA AND DOCUSATE SODIUM 50; 8.6 MG/1; MG/1
2 TABLET, FILM COATED ORAL NIGHTLY
COMMUNITY

## 2018-01-01 RX ORDER — METOPROLOL TARTRATE 5 MG/5ML
5 INJECTION INTRAVENOUS ONCE
Status: COMPLETED | OUTPATIENT
Start: 2018-01-01 | End: 2018-01-01

## 2018-01-01 RX ORDER — BISACODYL 10 MG
10 SUPPOSITORY, RECTAL RECTAL NIGHTLY PRN
COMMUNITY

## 2018-01-01 RX ORDER — LORAZEPAM 2 MG/ML
2 INJECTION INTRAMUSCULAR
Status: CANCELLED | OUTPATIENT
Start: 2018-01-01 | End: 2018-11-11

## 2018-01-01 RX ORDER — HYDROCODONE BITARTRATE AND ACETAMINOPHEN 5; 325 MG/1; MG/1
1 TABLET ORAL EVERY 4 HOURS PRN
Status: DISCONTINUED | OUTPATIENT
Start: 2018-01-01 | End: 2018-01-01 | Stop reason: HOSPADM

## 2018-01-01 RX ORDER — NITROGLYCERIN 0.4 MG/1
0.4 TABLET SUBLINGUAL
Status: DISCONTINUED | OUTPATIENT
Start: 2018-01-01 | End: 2018-01-01 | Stop reason: HOSPADM

## 2018-01-01 RX ORDER — PROMETHAZINE HYDROCHLORIDE 12.5 MG/1
6.25 SUPPOSITORY RECTAL EVERY 4 HOURS PRN
Status: CANCELLED | OUTPATIENT
Start: 2018-01-01

## 2018-01-01 RX ORDER — PROMETHAZINE HYDROCHLORIDE 6.25 MG/5ML
12.5 SYRUP ORAL EVERY 4 HOURS PRN
Status: DISCONTINUED | OUTPATIENT
Start: 2018-01-01 | End: 2018-01-01 | Stop reason: HOSPADM

## 2018-01-01 RX ORDER — LORAZEPAM 2 MG/ML
2 INJECTION INTRAMUSCULAR
Status: DISCONTINUED | OUTPATIENT
Start: 2018-01-01 | End: 2018-01-01 | Stop reason: HOSPADM

## 2018-01-01 RX ORDER — HYDROCODONE BITARTRATE AND ACETAMINOPHEN 5; 325 MG/1; MG/1
1 TABLET ORAL EVERY 4 HOURS PRN
Qty: 10 TABLET | Refills: 0 | Status: SHIPPED | OUTPATIENT
Start: 2018-01-01

## 2018-01-01 RX ORDER — ACETAMINOPHEN 650 MG/1
650 SUPPOSITORY RECTAL EVERY 4 HOURS PRN
Status: DISCONTINUED | OUTPATIENT
Start: 2018-01-01 | End: 2018-01-01 | Stop reason: HOSPADM

## 2018-01-01 RX ORDER — PROMETHAZINE HYDROCHLORIDE 25 MG/1
6.25 TABLET ORAL EVERY 4 HOURS PRN
Status: CANCELLED | OUTPATIENT
Start: 2018-01-01

## 2018-01-01 RX ORDER — MORPHINE SULFATE 2 MG/ML
1 INJECTION, SOLUTION INTRAMUSCULAR; INTRAVENOUS EVERY 4 HOURS PRN
Status: DISCONTINUED | OUTPATIENT
Start: 2018-01-01 | End: 2018-01-01

## 2018-01-01 RX ORDER — FUROSEMIDE 10 MG/ML
INJECTION INTRAMUSCULAR; INTRAVENOUS
Status: DISPENSED
Start: 2018-01-01 | End: 2018-01-01

## 2018-01-01 RX ORDER — PROMETHAZINE HYDROCHLORIDE 12.5 MG/1
6.25 SUPPOSITORY RECTAL EVERY 4 HOURS PRN
Status: DISCONTINUED | OUTPATIENT
Start: 2018-01-01 | End: 2018-01-01 | Stop reason: HOSPADM

## 2018-01-01 RX ORDER — SCOLOPAMINE TRANSDERMAL SYSTEM 1 MG/1
1 PATCH, EXTENDED RELEASE TRANSDERMAL
Status: DISCONTINUED | OUTPATIENT
Start: 2018-01-01 | End: 2018-01-01 | Stop reason: HOSPADM

## 2018-01-01 RX ORDER — FLUTICASONE PROPIONATE 50 MCG
2 SPRAY, SUSPENSION (ML) NASAL DAILY
COMMUNITY

## 2018-01-01 RX ORDER — LORAZEPAM 2 MG/ML
2 CONCENTRATE ORAL
Status: DISCONTINUED | OUTPATIENT
Start: 2018-01-01 | End: 2018-01-01 | Stop reason: HOSPADM

## 2018-01-01 RX ORDER — PROMETHAZINE HYDROCHLORIDE 6.25 MG/5ML
12.5 SYRUP ORAL EVERY 4 HOURS PRN
Status: CANCELLED | OUTPATIENT
Start: 2018-01-01

## 2018-01-01 RX ORDER — MORPHINE SULFATE 20 MG/ML
10 SOLUTION ORAL
Status: CANCELLED | OUTPATIENT
Start: 2018-01-01 | End: 2018-11-11

## 2018-01-01 RX ORDER — ALPRAZOLAM 0.25 MG/1
0.25 TABLET ORAL 2 TIMES DAILY PRN
Status: ON HOLD | COMMUNITY
End: 2018-01-01

## 2018-01-01 RX ORDER — SODIUM CHLORIDE 0.9 % (FLUSH) 0.9 %
1-10 SYRINGE (ML) INJECTION AS NEEDED
Status: DISCONTINUED | OUTPATIENT
Start: 2018-01-01 | End: 2018-01-01

## 2018-01-01 RX ORDER — SODIUM CHLORIDE 0.9 % (FLUSH) 0.9 %
1-10 SYRINGE (ML) INJECTION AS NEEDED
Status: DISCONTINUED | OUTPATIENT
Start: 2018-01-01 | End: 2018-01-01 | Stop reason: HOSPADM

## 2018-01-01 RX ORDER — HYDROCODONE BITARTRATE AND ACETAMINOPHEN 5; 325 MG/1; MG/1
1 TABLET ORAL EVERY 4 HOURS PRN
Status: DISCONTINUED | OUTPATIENT
Start: 2018-01-01 | End: 2018-01-01

## 2018-01-01 RX ORDER — MORPHINE SULFATE 10 MG/ML
4 INJECTION INTRAMUSCULAR; INTRAVENOUS; SUBCUTANEOUS EVERY 4 HOURS PRN
Status: DISCONTINUED | OUTPATIENT
Start: 2018-01-01 | End: 2018-01-01

## 2018-01-01 RX ORDER — PROMETHAZINE HYDROCHLORIDE 12.5 MG/1
12.5 SUPPOSITORY RECTAL EVERY 4 HOURS PRN
Status: CANCELLED | OUTPATIENT
Start: 2018-01-01

## 2018-01-01 RX ORDER — AMLODIPINE BESYLATE 10 MG/1
10 TABLET ORAL DAILY
COMMUNITY

## 2018-01-01 RX ORDER — MORPHINE SULFATE 20 MG/ML
10 SOLUTION ORAL
Status: DISCONTINUED | OUTPATIENT
Start: 2018-01-01 | End: 2018-01-01 | Stop reason: HOSPADM

## 2018-01-01 RX ORDER — ACETAMINOPHEN 325 MG/1
650 TABLET ORAL EVERY 4 HOURS PRN
Status: CANCELLED | OUTPATIENT
Start: 2018-01-01

## 2018-01-01 RX ORDER — HALOPERIDOL 0.5 MG/1
0.5 TABLET ORAL 4 TIMES DAILY
COMMUNITY

## 2018-01-01 RX ORDER — ATENOLOL 25 MG/1
25 TABLET ORAL DAILY
Status: DISCONTINUED | OUTPATIENT
Start: 2018-01-01 | End: 2018-01-01

## 2018-01-01 RX ORDER — PROMETHAZINE HYDROCHLORIDE 25 MG/1
12.5 TABLET ORAL EVERY 4 HOURS PRN
Status: CANCELLED | OUTPATIENT
Start: 2018-01-01

## 2018-01-01 RX ORDER — ALPRAZOLAM 0.25 MG/1
0.25 TABLET ORAL 2 TIMES DAILY PRN
Status: DISCONTINUED | OUTPATIENT
Start: 2018-01-01 | End: 2018-01-01 | Stop reason: HOSPADM

## 2018-01-01 RX ORDER — LORAZEPAM 2 MG/ML
0.5 INJECTION INTRAMUSCULAR
Status: CANCELLED | OUTPATIENT
Start: 2018-01-01 | End: 2018-11-11

## 2018-01-01 RX ORDER — ONDANSETRON 2 MG/ML
4 INJECTION INTRAMUSCULAR; INTRAVENOUS EVERY 6 HOURS PRN
Status: DISCONTINUED | OUTPATIENT
Start: 2018-01-01 | End: 2018-01-01

## 2018-01-01 RX ORDER — SODIUM CHLORIDE 9 MG/ML
125 INJECTION, SOLUTION INTRAVENOUS CONTINUOUS
Status: DISCONTINUED | OUTPATIENT
Start: 2018-01-01 | End: 2018-01-01

## 2018-01-01 RX ORDER — SODIUM CHLORIDE 0.9 % (FLUSH) 0.9 %
3 SYRINGE (ML) INJECTION EVERY 12 HOURS SCHEDULED
Status: DISCONTINUED | OUTPATIENT
Start: 2018-01-01 | End: 2018-01-01

## 2018-01-01 RX ORDER — BISACODYL 10 MG
10 SUPPOSITORY, RECTAL RECTAL NIGHTLY PRN
Status: DISCONTINUED | OUTPATIENT
Start: 2018-01-01 | End: 2018-01-01 | Stop reason: HOSPADM

## 2018-01-01 RX ORDER — MORPHINE SULFATE 20 MG/ML
5 SOLUTION ORAL
Status: CANCELLED | OUTPATIENT
Start: 2018-01-01 | End: 2018-11-11

## 2018-01-01 RX ADMIN — HYDROCODONE BITARTRATE AND ACETAMINOPHEN 1 TABLET: 5; 325 TABLET ORAL at 12:44

## 2018-01-01 RX ADMIN — MORPHINE SULFATE 1 MG: 2 INJECTION, SOLUTION INTRAMUSCULAR; INTRAVENOUS at 20:33

## 2018-01-01 RX ADMIN — MORPHINE SULFATE 1 MG: 2 INJECTION, SOLUTION INTRAMUSCULAR; INTRAVENOUS at 00:18

## 2018-01-01 RX ADMIN — AMLODIPINE BESYLATE 10 MG: 5 TABLET ORAL at 08:47

## 2018-01-01 RX ADMIN — FUROSEMIDE 60 MG: 10 INJECTION, SOLUTION INTRAMUSCULAR; INTRAVENOUS at 05:42

## 2018-01-01 RX ADMIN — SODIUM CHLORIDE 100 ML/HR: 9 INJECTION, SOLUTION INTRAVENOUS at 15:42

## 2018-01-01 RX ADMIN — Medication 3 ML: at 21:44

## 2018-01-01 RX ADMIN — SCOPOLAMINE 1 PATCH: 1 PATCH, EXTENDED RELEASE TRANSDERMAL at 11:00

## 2018-01-01 RX ADMIN — HYDRALAZINE HYDROCHLORIDE 25 MG: 25 TABLET, FILM COATED ORAL at 08:33

## 2018-01-01 RX ADMIN — ATENOLOL 25 MG: 25 TABLET ORAL at 12:09

## 2018-01-01 RX ADMIN — METOPROLOL TARTRATE 5 MG: 1 INJECTION, SOLUTION INTRAVENOUS at 03:34

## 2018-01-01 RX ADMIN — HALOPERIDOL 0.5 MG: 1 TABLET ORAL at 20:10

## 2018-01-01 RX ADMIN — HYDRALAZINE HYDROCHLORIDE 25 MG: 25 TABLET, FILM COATED ORAL at 12:08

## 2018-01-01 RX ADMIN — HALOPERIDOL 0.5 MG: 1 TABLET ORAL at 12:42

## 2018-01-01 RX ADMIN — MORPHINE SULFATE 4 MG: 2 INJECTION, SOLUTION INTRAMUSCULAR; INTRAVENOUS at 01:58

## 2018-01-01 RX ADMIN — AMLODIPINE BESYLATE 10 MG: 5 TABLET ORAL at 12:09

## 2018-01-01 RX ADMIN — Medication 3 ML: at 08:25

## 2018-01-01 RX ADMIN — HYDROCODONE BITARTRATE AND ACETAMINOPHEN 1 TABLET: 5; 325 TABLET ORAL at 08:48

## 2018-01-01 RX ADMIN — CEFTRIAXONE SODIUM 1 G: 1 INJECTION, SOLUTION INTRAVENOUS at 06:19

## 2018-01-01 RX ADMIN — ALPRAZOLAM 0.25 MG: 0.25 TABLET ORAL at 08:47

## 2018-01-01 RX ADMIN — HYDRALAZINE HYDROCHLORIDE 25 MG: 25 TABLET, FILM COATED ORAL at 20:01

## 2018-01-01 RX ADMIN — HALOPERIDOL 0.5 MG: 1 TABLET ORAL at 12:31

## 2018-01-01 RX ADMIN — HALOPERIDOL 0.5 MG: 1 TABLET ORAL at 00:59

## 2018-01-01 RX ADMIN — BICALUTAMIDE 50 MG: 50 TABLET ORAL at 08:33

## 2018-01-01 RX ADMIN — MORPHINE SULFATE 4 MG: 10 INJECTION INTRAVENOUS at 06:56

## 2018-01-01 RX ADMIN — HALOPERIDOL 0.5 MG: 1 TABLET ORAL at 23:31

## 2018-01-01 RX ADMIN — ATENOLOL 25 MG: 25 TABLET ORAL at 08:33

## 2018-01-01 RX ADMIN — Medication 3 ML: at 09:00

## 2018-01-01 RX ADMIN — BICALUTAMIDE 50 MG: 50 TABLET ORAL at 08:48

## 2018-01-01 RX ADMIN — ALPRAZOLAM 0.25 MG: 0.25 TABLET ORAL at 09:19

## 2018-01-01 RX ADMIN — LORAZEPAM 0.5 MG: 2 INJECTION INTRAMUSCULAR; INTRAVENOUS at 11:00

## 2018-01-01 RX ADMIN — HALOPERIDOL 0.5 MG: 1 TABLET ORAL at 18:00

## 2018-01-01 RX ADMIN — AMLODIPINE BESYLATE 10 MG: 5 TABLET ORAL at 09:19

## 2018-01-01 RX ADMIN — AMLODIPINE BESYLATE 10 MG: 5 TABLET ORAL at 08:33

## 2018-01-01 RX ADMIN — Medication 3 ML: at 20:02

## 2018-01-01 RX ADMIN — MORPHINE SULFATE 1 MG: 2 INJECTION, SOLUTION INTRAMUSCULAR; INTRAVENOUS at 14:53

## 2018-01-01 RX ADMIN — HALOPERIDOL 0.5 MG: 1 TABLET ORAL at 11:50

## 2018-01-01 RX ADMIN — ATENOLOL 25 MG: 25 TABLET ORAL at 09:20

## 2018-01-01 RX ADMIN — SODIUM CHLORIDE 100 ML/HR: 9 INJECTION, SOLUTION INTRAVENOUS at 05:23

## 2018-01-01 RX ADMIN — Medication 3 ML: at 08:33

## 2018-01-01 RX ADMIN — HALOPERIDOL 0.5 MG: 1 TABLET ORAL at 08:32

## 2018-01-01 RX ADMIN — SODIUM CHLORIDE 500 ML: 9 INJECTION, SOLUTION INTRAVENOUS at 04:36

## 2018-01-01 RX ADMIN — HYDRALAZINE HYDROCHLORIDE 25 MG: 25 TABLET, FILM COATED ORAL at 09:19

## 2018-01-01 RX ADMIN — HYDRALAZINE HYDROCHLORIDE 25 MG: 25 TABLET, FILM COATED ORAL at 08:47

## 2018-01-01 RX ADMIN — BICALUTAMIDE 50 MG: 50 TABLET ORAL at 09:20

## 2018-01-01 RX ADMIN — HYDROMORPHONE HYDROCHLORIDE 1 MG: 1 INJECTION, SOLUTION INTRAMUSCULAR; INTRAVENOUS; SUBCUTANEOUS at 11:00

## 2018-01-01 RX ADMIN — HALOPERIDOL 0.5 MG: 1 TABLET ORAL at 18:05

## 2018-01-01 RX ADMIN — ATENOLOL 25 MG: 25 TABLET ORAL at 08:47

## 2018-01-01 RX ADMIN — METOPROLOL TARTRATE 5 MG: 5 INJECTION, SOLUTION INTRAVENOUS at 05:45

## 2018-01-01 RX ADMIN — HALOPERIDOL 0.5 MG: 1 TABLET ORAL at 07:35

## 2018-01-01 RX ADMIN — BICALUTAMIDE 50 MG: 50 TABLET ORAL at 12:09

## 2018-01-01 RX ADMIN — Medication 3 ML: at 21:43

## 2018-01-01 RX ADMIN — DOCUSATE SODIUM -SENNOSIDES 2 TABLET: 50; 8.6 TABLET, COATED ORAL at 20:01

## 2018-01-01 RX ADMIN — SODIUM CHLORIDE 1000 ML: 9 INJECTION, SOLUTION INTRAVENOUS at 03:34

## 2018-01-01 RX ADMIN — CEFTRIAXONE SODIUM 1 G: 1 INJECTION, SOLUTION INTRAVENOUS at 05:22

## 2018-01-01 RX ADMIN — SODIUM CHLORIDE 125 ML/HR: 9 INJECTION, SOLUTION INTRAVENOUS at 05:40

## 2018-01-01 RX ADMIN — Medication 3 ML: at 08:51

## 2018-01-01 RX ADMIN — SODIUM CHLORIDE 500 ML: 9 INJECTION, SOLUTION INTRAVENOUS at 05:22

## 2018-01-01 RX ADMIN — HYDROCODONE BITARTRATE AND ACETAMINOPHEN 1 TABLET: 5; 325 TABLET ORAL at 09:05

## 2018-01-01 RX ADMIN — HALOPERIDOL 0.5 MG: 1 TABLET ORAL at 12:09

## 2018-01-01 RX ADMIN — HALOPERIDOL 0.5 MG: 1 TABLET ORAL at 06:52

## 2018-10-05 PROBLEM — N18.9 CKD (CHRONIC KIDNEY DISEASE): Status: ACTIVE | Noted: 2018-01-01

## 2018-10-05 PROBLEM — R77.8 ELEVATED TROPONIN: Status: ACTIVE | Noted: 2018-01-01

## 2018-10-05 PROBLEM — I48.91 ATRIAL FIBRILLATION WITH RVR (HCC): Status: ACTIVE | Noted: 2018-01-01

## 2018-10-05 PROBLEM — R10.84 GENERALIZED ABDOMINAL PAIN: Status: ACTIVE | Noted: 2018-01-01

## 2018-10-05 PROBLEM — R41.89 COGNITIVE IMPAIRMENT: Status: ACTIVE | Noted: 2018-01-01

## 2018-10-05 PROBLEM — I73.9 PERIPHERAL VASCULAR DISEASE (HCC): Status: ACTIVE | Noted: 2018-01-01

## 2018-10-05 PROBLEM — I50.9 ACUTE CONGESTIVE HEART FAILURE (HCC): Status: ACTIVE | Noted: 2018-01-01

## 2018-10-05 NOTE — ED NOTES
Princess with [facility] called and stated she would fax additonal information about patient.     Dali Still RN  10/05/18 6701

## 2018-10-05 NOTE — ED PROVIDER NOTES
"Pt presents to the ED complaining of generalized weakness and fall from bed that occurred yesterday, that occurred while trying to \"work himself out of bed\". Pt states he did not have any pain from fall. Per pt, he is able to ambulate with assistance. Pt denies cough, SOA, abd pain, palpitations or chest pain. Pt states he has not been being taken care of properly by his caregiver and he has not eaten at all today. Per pt, EMS was called by \"a brooke named Roosevelt\" and he asked to be presented to ED. [facility] come here for a second opinion. Pt is a poor historian.    On exam,   Lungs/cardiovascular: Heart is irregularly irregular and tachycardic, without murmur, lungs are clear bilaterally   Abdomen: Abd is soft, non distended, non tender, normal active bowel sounds  HENT: oropharynx is clear ad moist, no signs of cervical adenopathy.     EKG           EKG time: 0309  Rhythm/Rate: Atrial Fibrillation 129  P waves and FL: nml  QRS, axis: RBBB   ST and T waves: nml     Interpreted Contemporaneously by me, independently viewed  No prior for comparison.    I agree with midlevel plan to run further labs for evaluation of pt's condition.     The YAJAIRA and I have discussed this patient's history, physical exam, and treatment plan.  I have reviewed the documentation and personally had a face to face interaction with the patient. I affirm the documentation and agree with the treatment and plan.  The attached note describes my personal findings.     Documentation assistance provided by rolando Major for Dr. Cordova.  Information recorded by the rolando was done at my direction and has been verified and validated by me.              Vanessa Major  10/05/18 4332       Luc Cordova MD  10/05/18 3263    "

## 2018-10-05 NOTE — PLAN OF CARE
Problem: Patient Care Overview  Goal: Plan of Care Review  Outcome: Ongoing (interventions implemented as appropriate)   10/05/18 1941   Coping/Psychosocial   Plan of Care Reviewed With patient   Plan of Care Review   Progress improving   OTHER   Outcome Summary VSS, Heart rate 130's highest but mostly low 100's. Pt states he is feeling much better. Pressure injury upon admission- picture in chart. Denies any pain. Orientated x3, disoriented as to why he is in the hospital. Will continue to monitor.

## 2018-10-05 NOTE — PROGRESS NOTES
Discharge Planning Assessment  [facility]     Patient Name: Herbert Harris  MRN: [id number]  Today's Date: 10/5/2018    Admit Date: 10/5/2018          Discharge Needs Assessment    No documentation.             Discharge Plan     Row Name 10/05/18 1537       Plan    Plan Comments The patient was current with [facility] prior to admission. The [facility] team is following and will assist in any discharge needs that may arise.MARTINE Cruz RN, CCP.         Destination     No service coordination in this encounter.      Durable Medical Equipment     No service coordination in this encounter.      Dialysis/Infusion     No service coordination in this encounter.      Home Medical Care     No service coordination in this encounter.      Social Care     No service coordination in this encounter.                Demographic Summary    No documentation.           Functional Status    No documentation.           Psychosocial    No documentation.           Abuse/Neglect    No documentation.           Legal    No documentation.           Substance Abuse    No documentation.           Patient Forms    No documentation.         Juliet Cruz, RN

## 2018-10-05 NOTE — ED NOTES
"Per EMS, the patient has no physical complaints at this time.  Pts wishes to be placed in a long-term care facility or have other arrangements made in order to provide care for him.  Per EMS [facility] but \"Requested [facility]astrian to give somewhere else a try.\"  Pt denies SI/HI at this time.       Barbie Marie, RN  10/05/18 0222    "

## 2018-10-05 NOTE — PROGRESS NOTES
Discharge Planning Assessment  [facility]     Patient Name: Herbert Harris  MRN: [id number]  Today's Date: 10/5/2018    Admit Date: 10/5/2018          Discharge Needs Assessment    No documentation.             Discharge Plan     Row Name 10/05/18 1546       Plan    Plan Comments Per Latoya/[facility]/ APS was notified for self neglect. [facility] will only take if the patient is having acute symptom care or actively dying. Was at [facility] (one day only 9-28) and he refused to stay and wanted to go home. Juliet Cruz RN, CCP    Row Name 10/05/18 1539       Plan    Plan Comments The patient was current with [facility] prior to admission. The [facility] team is following and will assist in any discharge needs that may arise.MARTINE Cruz RN, CCP.         Destination     No service coordination in this encounter.      Durable Medical Equipment     No service coordination in this encounter.      Dialysis/Infusion     No service coordination in this encounter.      Home Medical Care     No service coordination in this encounter.      Social Care     No service coordination in this encounter.                Demographic Summary    No documentation.           Functional Status    No documentation.           Psychosocial    No documentation.           Abuse/Neglect    No documentation.           Legal    No documentation.           Substance Abuse    No documentation.           Patient Forms    No documentation.         Juliet Cruz RN

## 2018-10-05 NOTE — H&P
"    Patient Name:  Herbert Harris  YOB: 1929  MRN:  [id number]  Admit Date:  10/5/2018  Patient Care Team:  Provider, No Known as PCP - General      Chief Complaint   Patient presents with   • Fall     Fall occuring from couch this morning.  Pt reports that the person who \"usually checks on him is unable to check on him anymore.\"  Pt denies SI/HI at this time.  No complaints at this time.       Subjective   Mr. Harris is a 89 y.o. male with a history of cognitive impairment, Afib, and prostate cancer that presents to [facility] complaining of abdominal pain and his \"neighbors locking him in\".  He had apparently slid out of his bed  History is quite difficult to obtain from him as he is confused and quite tangential. He denies head trauma or syncope.  He was found to be in rapid afib with some pulmonary arterial congestion on admission.       History of Present Illness    Past Medical History:   Diagnosis Date   • Atrial fibrillation (CMS/HCC)    • Cancer (CMS/HCC)     prostate with mets   • Cognitive impairment    • Hearing loss    • Normocytic normochromic anemia    • Spinal stenosis of lumbar region    • Spondylolisthesis at L5-S1 level      No past surgical history on file.  No family history on file.  Social History   Substance Use Topics   • Smoking status: Not on file   • Smokeless tobacco: Not on file   • Alcohol use Not on file       (Not in a hospital admission)  Allergies:  No Known Allergies    Review of Systems   Constitutional: Negative for chills and fever.   HENT: Negative for nosebleeds, sore throat and trouble swallowing.    Eyes: Negative for photophobia and visual disturbance.   Respiratory: Negative for cough, choking and shortness of breath.    Cardiovascular: Negative for chest pain, palpitations and leg swelling.   Gastrointestinal: Positive for abdominal pain. Negative for blood in stool, constipation, diarrhea, nausea and vomiting.   Endocrine: Negative for cold " intolerance and heat intolerance.   Genitourinary: Positive for difficulty urinating. Negative for dysuria and hematuria.   Musculoskeletal: Negative for arthralgias and myalgias.   Skin: Negative for pallor and rash.   Neurological: Negative for dizziness, light-headedness and headaches.   Hematological: Negative for adenopathy. Does not bruise/bleed easily.   Psychiatric/Behavioral: Positive for confusion and decreased concentration.       Objective    Vital Signs  Temp:  [98.1 °F (36.7 °C)-98.2 °F (36.8 °C)] 98.2 °F (36.8 °C)  Heart Rate:  [] 129  Resp:  [18] 18  BP: (104-151)/(62-95) 144/62  SpO2:  [89 %-97 %] 97 %  on   ;   Device (Oxygen Therapy): room air  Body mass index is 19.39 kg/m².    Physical Exam   Constitutional: No distress.   HENT:   Head: Normocephalic and atraumatic.   Mouth/Throat: Oropharynx is clear and moist.   Eyes: Pupils are equal, round, and reactive to light. Conjunctivae and EOM are normal.   Neck: Normal range of motion. Neck supple.   Cardiovascular: Intact distal pulses.  An irregularly irregular rhythm present. Tachycardia present.    Pulmonary/Chest: Effort normal. He has rales (mild, bibasilar).   Abdominal: Soft. Bowel sounds are normal. There is tenderness (suprapubic).   Musculoskeletal: He exhibits no edema or tenderness.   Neurological: He is alert. He has normal strength. He is disoriented (x2). No cranial nerve deficit. He displays no Babinski's sign on the right side. He displays no Babinski's sign on the left side.   Reflex Scores:       Patellar reflexes are 2+ on the right side and 2+ on the left side.  Skin: Skin is warm and dry. He is not diaphoretic.   Psychiatric: His speech is tangential. Cognition and memory are impaired.   Nursing note and vitals reviewed.      Results Review:  I reviewed the patient's new clinical results.  I reviewed the patient's new imaging results and agree with the interpretation.  I reviewed the patient's other test results and agree  with the interpretation  I personally viewed and interpreted the patient's EKG/Telemetry data  Discussed with ED provider.    Lab Results (last 24 hours)     Procedure Component Value Units Date/Time    CBC & Differential [[id number]] Collected:  10/05/18 0325    Specimen:  Blood Updated:  10/05/18 0416    Narrative:       The following orders were created for panel order CBC & Differential.  Procedure                               Abnormality         Status                     ---------                               -----------         ------                     Scan Slide[[id number]]                                       Final result               CBC Auto Differential[[id number]]        Abnormal            Final result                 Please view results for these tests on the individual orders.    Comprehensive Metabolic Panel [[id number]]  (Abnormal) Collected:  10/05/18 0325    Specimen:  Blood Updated:  10/05/18 0405     Glucose 123 (H) mg/dL      BUN 56 (H) mg/dL      Creatinine 2.96 (H) mg/dL      Sodium 136 mmol/L      Potassium 5.0 mmol/L      Chloride 101 mmol/L      CO2 20.1 (L) mmol/L      Calcium 9.2 mg/dL      Total Protein 6.2 g/dL      Albumin 2.90 (L) g/dL      ALT (SGPT) 20 U/L      AST (SGOT) 56 (H) U/L      Alkaline Phosphatase 613 (H) U/L      Total Bilirubin 0.5 mg/dL      eGFR Non African Amer 20 (L) mL/min/1.73      Globulin 3.3 gm/dL      A/G Ratio 0.9 g/dL      BUN/Creatinine Ratio 18.9     Anion Gap 14.9 mmol/L     Narrative:       The MDRD GFR formula is only valid for adults with stable renal function between ages 18 and 70.    Troponin [[id number]]  (Abnormal) Collected:  10/05/18 0325    Specimen:  Blood Updated:  10/05/18 0416     Troponin T 0.065 (H) ng/mL     Narrative:       Troponin T Reference Ranges:  Less than 0.03 ng/mL:    Negative for AMI  0.03 to 0.09 ng/mL:      Indeterminant for AMI  Greater than 0.09 ng/mL: Positive for AMI    Magnesium [[id number]]  (Normal) Collected:   10/05/18 0325    Specimen:  Blood Updated:  10/05/18 0405     Magnesium 2.0 mg/dL     CK [[id number]]  (Normal) Collected:  10/05/18 0325    Specimen:  Blood Updated:  10/05/18 0405     Creatine Kinase 49 U/L     CBC Auto Differential [[id number]]  (Abnormal) Collected:  10/05/18 0325    Specimen:  Blood Updated:  10/05/18 0416     WBC 4.43 (L) 10*3/mm3      RBC 3.10 (L) 10*6/mm3      Hemoglobin 8.8 (L) g/dL      Hematocrit 27.8 (L) %      MCV 89.7 fL      MCH 28.4 pg      MCHC 31.7 (L) g/dL      RDW 16.4 (H) %      RDW-SD 53.2 fl      MPV -- fL      Comment:          Platelets 61 (L) 10*3/mm3      Neutrophil % 72.3 %      Lymphocyte % 11.5 (L) %      Monocyte % 12.4 (H) %      Eosinophil % 3.6 %      Basophil % 0.2 %      Immature Grans % 1.4 (H) %      Neutrophils, Absolute 3.20 10*3/mm3      Lymphocytes, Absolute 0.51 (L) 10*3/mm3      Monocytes, Absolute 0.55 10*3/mm3      Eosinophils, Absolute 0.16 10*3/mm3      Basophils, Absolute 0.01 10*3/mm3      Immature Grans, Absolute 0.06 (H) 10*3/mm3      nRBC 0.0 /100 WBC     Scan Slide [[id number]] Collected:  10/05/18 0325    Specimen:  Blood Updated:  10/05/18 0416     Anisocytosis Mod/2+     WBC Morphology Normal     Platelet Morphology Normal    BNP [[id number]]  (Abnormal) Collected:  10/05/18 0325    Specimen:  Blood Updated:  10/05/18 0450     proBNP 38,044.0 (H) pg/mL     Narrative:       Among patients with dyspnea, NT-proBNP is highly sensitive for the detection of acute congestive heart failure. In addition NT-proBNP of <300 pg/ml effectively rules out acute congestive heart failure with 99% negative predictive value.    Hemoglobin A1c [[id number]]  (Abnormal) Collected:  10/05/18 0325    Specimen:  Blood Updated:  10/05/18 0955     Hemoglobin A1C 5.80 (H) %     Narrative:       Hemoglobin A1C Ranges:    Increased Risk for Diabetes  5.7% to 6.4%  Diabetes                     >= 6.5%  Diabetic Goal                < 7.0%    Urinalysis With Microscopic If  Indicated (No Culture) - Urine, Catheter [[id number]]  (Abnormal) Collected:  10/05/18 0430    Specimen:  Urine from Urine, Catheter Updated:  10/05/18 0444     Color, UA Yellow     Appearance, UA Clear     pH, UA <=5.0     Specific Gravity, UA 1.020     Glucose, UA Negative     Ketones, UA Negative     Bilirubin, UA Negative     Blood, UA Negative     Protein,  mg/dL (2+) (A)     Leuk Esterase, UA Negative     Nitrite, UA Negative     Urobilinogen, UA 0.2 E.U./dL    Urinalysis, Microscopic Only - Urine, Clean Catch [[id number]] Collected:  10/05/18 0430    Specimen:  Urine from Urine, Catheter Updated:  10/05/18 0444     RBC, UA 0-2 /HPF      WBC, UA 0-2 /HPF      Bacteria, UA None Seen /HPF      Squamous Epithelial Cells, UA 0-2 /HPF      Hyaline Casts, UA 3-6 /LPF      Methodology Automated Microscopy          XR Chest 2 View   Final Result       1. Cardiomegaly and vascular congestion. There is some bibasilar   consolidation noted. While this may reflect atelectasis, possibility of   superimposed infiltrate is not excluded. Correlation with clinical   presentation is recommended.       This report was finalized on 10/5/2018 3:57 AM by Dr. Dottie Maurice M.D.            Assessment/Plan   Active Hospital Problems    Diagnosis Date Noted   • **Atrial fibrillation with RVR (CMS/HCC) [I48.91] 10/05/2018   • Generalized abdominal pain [R10.84] 10/05/2018   • Acute congestive heart failure (CMS/HCC) [I50.9] 10/05/2018   • Cognitive impairment [R41.89] 10/05/2018   • CKD (chronic kidney disease) [N18.9] 10/05/2018   • Elevated troponin [R74.8] 10/05/2018   • Peripheral vascular disease (CMS/[facility]) [I73.9] 10/05/2018      Resolved Hospital Problems    Diagnosis Date Noted Date Resolved   No resolved problems to display.   Afib with RVR  - given IV metoprolol in ER, on atenolol at home will restart this  - has some volume overload possibly related to rapid heart rate, given IV lasix x2 will hold off on further  diuresis for now  - [facility], particularly related to prior [facility]  - BNP 38k but likely influenced by renal dysfunction  - not on AC as outpatient, would likely be a poor candidate    Abdominal Pain  - probably  related give prostate cancer; elevated AP and AST noted   - check non-contrasted abdominal CT due to his renal function    CKD  - possible ROMAN, unknown baseline-[facility]aiting [facility] records  - check PVR; UA,is negative except for proteinuria  - will forego FeNa as this is unreliable after diuretic  - check renal u/s  - get previous labs from [facility]    Elevated Troponin  - equivocal, probably influenced by renal dysfunction and increased demand from rapid heart rate  - will trend  - ECG with no specific ECG changes, no c/o CP    PVD  - s/p surgical intervention on LLE from Dr. Barrios   - check records  - no evidence of LE ischemia currently    Cognitive Impairment  - I do not know his baseline, but apparently he did require outside help from his neighbors  - will check head CT, B12, TSH  - check head CT  - get [facility]    DVT Prophylaxis  - SCDs      I discussed the patients findings and my recommendations with patient and nursing staff.      Dexter Weems MD  [facility]  10/05/18  9:59 AM

## 2018-10-05 NOTE — PROGRESS NOTES
Continued Stay Note  [facility]     Patient Name: Herbert Harris  MRN: [id number]  Today's Date: 10/5/2018    Admit Date: 10/5/2018          Discharge Plan     Row Name 10/05/18 1925       Plan    Plan Undetermined    Patient/Family in Agreement with Plan yes    Plan Comments Confirmed that pt active with [facility] since July 25.  Attempted to  reach daughter Kendra without answer.  Call place to stepdaughter (Latoya [phone]), voicemail left.  Informed that pt looking for long term care facility.  [facility] ([phone]) following and will assist with discharge planning.  JEANIE lopez RN    Row Name 10/05/18 5080       Plan    Plan Comments Per [facility]/ APS was notified for self neglect. [facility] will only take if the patient is having acute symptom care or actively dying. Was at [facility] (one day only 9-28) and he refused to stay and wanted to go home. Juliet Cruz RN, CCP    Row Name 10/05/18 9100       Plan    Plan Comments The patient was current with [facility] prior to admission. The [facility] team is following and will assist in any discharge needs that may arise.MARTINE Cruz RN, CCP.               Discharge Codes    No documentation.           Allison Lopez

## 2018-10-05 NOTE — ED PROVIDER NOTES
" EMERGENCY DEPARTMENT ENCOUNTER    CHIEF COMPLAINT  Chief Complaint: generalized weakness  History given by: patient  History limited by: christine youngian  Room Number: 09/09  PMD: Provider, No Known      HPI:  Pt is a 89 y.o. male who presents complaining generalized weakness s/p fall that occurred PTA. Pt denies falling out of bed. Pt reports he was \"blocked In\" his bed, and unable to get out. Pt reports he tried to get out of bed so he could move around on the floor. Pt reports his daughter is his care giver, and that she has been unable to care for him due to lack of time off work. Pt reports having artery repairment surgery on his left leg years ago.     Duration:  PTA  Onset: sudden  Timing: constant  Location: unknown  Radiation: unknown  Quality: unknown  Intensity/Severity: unknown  Progression: unknown  Associated Symptoms: unknown  Aggravating Factors: unknown  Alleviating Factors: unknown  Previous Episodes: unknown  Treatment before arrival: none    PAST MEDICAL HISTORY  Active Ambulatory Problems     Diagnosis Date Noted   • No Active Ambulatory Problems     Resolved Ambulatory Problems     Diagnosis Date Noted   • No Resolved Ambulatory Problems     No Additional Past Medical History       PAST SURGICAL HISTORY  No past surgical history on file.    FAMILY HISTORY  No family history on file.    SOCIAL HISTORY  Social History     Social History   • Marital status:      Spouse name: N/A   • Number of children: N/A   • Years of education: N/A     Occupational History   • Not on file.     Social History Main Topics   • Smoking status: Not on file   • Smokeless tobacco: Not on file   • Alcohol use Not on file   • Drug use: Unknown   • Sexual activity: Not on file     Other Topics Concern   • Not on file     Social History Narrative   • No narrative on file       ALLERGIES  Patient has no known allergies.    REVIEW OF SYSTEMS  Review of Systems   Reason unable to perform ROS: Christine historian. "       PHYSICAL EXAM  ED Triage Vitals [10/05/18 0217]   Temp Heart Rate Resp BP SpO2   98.1 °F (36.7 °C) 86 18 139/62 96 %      Temp src Heart Rate Source Patient Position BP Location FiO2 (%)   Tympanic Monitor -- -- --       Physical Exam   Constitutional: He is oriented to person, place, and time. He appears cachectic. He appears toxic. No distress.   HENT:   Head: Normocephalic and atraumatic.   Eyes: Pupils are equal, round, and reactive to light. EOM are normal.   Neck: Normal range of motion. Neck supple.   Cardiovascular: Normal heart sounds.  An irregularly irregular rhythm present. Tachycardia present.    Pulmonary/Chest: Effort normal and breath sounds normal. No respiratory distress.   Abdominal: Soft. There is no tenderness. There is no rebound and no guarding.   Musculoskeletal: Normal range of motion. He exhibits no edema.   Neurological: He is alert and oriented to person, place, and time. He has normal sensation and normal strength.   Skin: Skin is warm and dry.   Psychiatric: Mood and affect normal.   Nursing note and vitals reviewed.      LAB RESULTS  Lab Results (last 24 hours)     Procedure Component Value Units Date/Time    CBC & Differential [[id number]] Collected:  10/05/18 0325    Specimen:  Blood Updated:  10/05/18 0416    Narrative:       The following orders were created for panel order CBC & Differential.  Procedure                               Abnormality         Status                     ---------                               -----------         ------                     Scan Slide[[id number]]                                       Final result               CBC Auto Differential[[id number]]        Abnormal            Final result                 Please view results for these tests on the individual orders.    Comprehensive Metabolic Panel [[id number]]  (Abnormal) Collected:  10/05/18 0325    Specimen:  Blood Updated:  10/05/18 0405     Glucose 123 (H) mg/dL      BUN 56 (H) mg/dL       Creatinine 2.96 (H) mg/dL      Sodium 136 mmol/L      Potassium 5.0 mmol/L      Chloride 101 mmol/L      CO2 20.1 (L) mmol/L      Calcium 9.2 mg/dL      Total Protein 6.2 g/dL      Albumin 2.90 (L) g/dL      ALT (SGPT) 20 U/L      AST (SGOT) 56 (H) U/L      Alkaline Phosphatase 613 (H) U/L      Total Bilirubin 0.5 mg/dL      eGFR Non African Amer 20 (L) mL/min/1.73      Globulin 3.3 gm/dL      A/G Ratio 0.9 g/dL      BUN/Creatinine Ratio 18.9     Anion Gap 14.9 mmol/L     Narrative:       The MDRD GFR formula is only valid for adults with stable renal function between ages 18 and 70.    Troponin [[id number]]  (Abnormal) Collected:  10/05/18 0325    Specimen:  Blood Updated:  10/05/18 0416     Troponin T 0.065 (H) ng/mL     Narrative:       Troponin T Reference Ranges:  Less than 0.03 ng/mL:    Negative for AMI  0.03 to 0.09 ng/mL:      Indeterminant for AMI  Greater than 0.09 ng/mL: Positive for AMI    Magnesium [[id number]]  (Normal) Collected:  10/05/18 0325    Specimen:  Blood Updated:  10/05/18 0405     Magnesium 2.0 mg/dL     CK [[id number]]  (Normal) Collected:  10/05/18 0325    Specimen:  Blood Updated:  10/05/18 0405     Creatine Kinase 49 U/L     CBC Auto Differential [[id number]]  (Abnormal) Collected:  10/05/18 0325    Specimen:  Blood Updated:  10/05/18 0416     WBC 4.43 (L) 10*3/mm3      RBC 3.10 (L) 10*6/mm3      Hemoglobin 8.8 (L) g/dL      Hematocrit 27.8 (L) %      MCV 89.7 fL      MCH 28.4 pg      MCHC 31.7 (L) g/dL      RDW 16.4 (H) %      RDW-SD 53.2 fl      MPV -- fL      Comment:          Platelets 61 (L) 10*3/mm3      Neutrophil % 72.3 %      Lymphocyte % 11.5 (L) %      Monocyte % 12.4 (H) %      Eosinophil % 3.6 %      Basophil % 0.2 %      Immature Grans % 1.4 (H) %      Neutrophils, Absolute 3.20 10*3/mm3      Lymphocytes, Absolute 0.51 (L) 10*3/mm3      Monocytes, Absolute 0.55 10*3/mm3      Eosinophils, Absolute 0.16 10*3/mm3      Basophils, Absolute 0.01 10*3/mm3      Immature Grans,  Absolute 0.06 (H) 10*3/mm3      nRBC 0.0 /100 WBC     Scan Slide [[id number]] Collected:  10/05/18 0325    Specimen:  Blood Updated:  10/05/18 0416     Anisocytosis Mod/2+     WBC Morphology Normal     Platelet Morphology Normal    BNP [[id number]]  (Abnormal) Collected:  10/05/18 0325    Specimen:  Blood Updated:  10/05/18 0450     proBNP 38,044.0 (H) pg/mL     Narrative:       Among patients with dyspnea, NT-proBNP is highly sensitive for the detection of acute congestive heart failure. In addition NT-proBNP of <300 pg/ml effectively rules out acute congestive heart failure with 99% negative predictive value.    Urinalysis With Microscopic If Indicated (No Culture) - Urine, Catheter [[id number]]  (Abnormal) Collected:  10/05/18 0430    Specimen:  Urine from Urine, Catheter Updated:  10/05/18 0444     Color, UA Yellow     Appearance, UA Clear     pH, UA <=5.0     Specific Gravity, UA 1.020     Glucose, UA Negative     Ketones, UA Negative     Bilirubin, UA Negative     Blood, UA Negative     Protein,  mg/dL (2+) (A)     Leuk Esterase, UA Negative     Nitrite, UA Negative     Urobilinogen, UA 0.2 E.U./dL    Urinalysis, Microscopic Only - Urine, Clean Catch [[id number]] Collected:  10/05/18 0430    Specimen:  Urine from Urine, Catheter Updated:  10/05/18 0444     RBC, UA 0-2 /HPF      WBC, UA 0-2 /HPF      Bacteria, UA None Seen /HPF      Squamous Epithelial Cells, UA 0-2 /HPF      Hyaline Casts, UA 3-6 /LPF      Methodology Automated Microscopy          I ordered the above labs and reviewed the results    RADIOLOGY  XR Chest 2 View   Final Result       1. Cardiomegaly and vascular congestion. There is some bibasilar   consolidation noted. While this may reflect atelectasis, possibility of   superimposed infiltrate is not excluded. Correlation with clinical   presentation is recommended.       This report was finalized on 10/5/2018 3:57 AM by Dr. Dottie Maurice M.D.               I ordered the above noted  radiological studies. Interpreted by radiologist. Reviewed by me in PACS.       PROCEDURES  Procedures  EKG           EKG time: 0309  Rhythm/Rate: Afib 129  P waves and NV: absent  QRS, axis: RBBB   ST and T waves: nml     Interpreted Contemporaneously by me, independently viewed  No prior EKG for comparison.      PROGRESS AND CONSULTS      3:09 AM  Reviewed pt's history and workup with Dr. Cordova.  After a bedside evaluation; Dr Cordova agrees with the plan of care      5:50 AM  Discussed case with Dr Butler  Reviewed history, exam, results and treatments.  Discussed concerns and plan of care. Dr Butler accepts pt to be admitted to Med-Surg.      MEDICAL DECISION MAKING  Results were reviewed/discussed with the patient and they were also made aware of online access. Pt also made aware that some labs, such as cultures, will not be resulted during ER visit and follow up with PMD is necessary.     MDM  Number of Diagnoses or Management Options  Acute on chronic diastolic congestive heart failure (CMS/HCC):   Atrial fibrillation with RVR (CMS/HCC):      Amount and/or Complexity of Data Reviewed  Clinical lab tests: reviewed (GFR: 20  Troponin: 0.065  Creatine: 2.96  ProBNP: 38,044.0  Hgb: 8.8)  Tests in the radiology section of CPT®: reviewed (XR Chest 2 View  Final Result     1. Cardiomegaly and vascular congestion. There is some bibasilar  consolidation noted. While this may reflect atelectasis, possibility of  superimposed infiltrate is not excluded. Correlation with clinical  presentation is recommended.       )  Review and summarize past medical records: yes ([facility]:   GFR: 17  Creatinine: 3.7  Hgb: 8.2    He was admitted 09/18-09/27/18 for metastatic prostate cancer. Pt was evaluated by  and found not eligible for [facility].)           DIAGNOSIS  Final diagnoses:   Atrial fibrillation with RVR (CMS/HCC)   Acute on chronic diastolic congestive heart  failure (CMS/HCC)   Anemia, unspecified type   Caregiver not readily available       DISPOSITION  ADMISSION    Discussed treatment plan and reason for admission with pt/family and admitting physician.  Pt/family voiced understanding of the plan for admission for further testing/treatment as needed.         Latest Documented Vital Signs:  As of 6:14 AM  BP- 136/82 HR- (!) 135 Temp- 98.1 °F (36.7 °C) (Tympanic) O2 sat- 91%    --  Documentation assistance provided by rolando Martinez for Raji Acuña PA-C.  Information recorded by the rolando was done at my direction and has been verified and validated by me.          Marjorie Martinez  10/05/18 0605       Nitin Acuña III, PA  10/05/18 0614

## 2018-10-06 PROBLEM — C80.1 CANCER (HCC): Status: ACTIVE | Noted: 2018-01-01

## 2018-10-06 NOTE — PLAN OF CARE
Problem: Patient Care Overview  Goal: Plan of Care Review  Outcome: Ongoing (interventions implemented as appropriate)   10/06/18 4466   Coping/Psychosocial   Plan of Care Reviewed With patient   Plan of Care Review   Progress improving   OTHER   Outcome Summary a-fib w/ episodes of RVR, no c/o pain, stage 2 pressure injury on coccyx barrier cream applied, turn assistoffered q2 hr, will cont to monitor

## 2018-10-06 NOTE — NURSING NOTE
Spoke with Diana from Wound care. Instructions given to use moisture barrier cream on the reddened coccyx and wound nurses will see Monday.

## 2018-10-06 NOTE — PROGRESS NOTES
"DAILY PROGRESS NOTE  [facility]    Patient Identification:  Name: Herbert Harris  Age: 89 y.o.  Sex: male  :  1929  MRN: [id number]         Primary Care Physician: Provider, No Known    Subjective:  Interval History:He feels bad. Hurting all over.    Objective:    Scheduled Meds:  amLODIPine 10 mg Oral Daily   atenolol 25 mg Oral Daily   bicalutamide 50 mg Oral Daily   haloperidol 0.5 mg Oral 4x Daily   hydrALAZINE 25 mg Oral BID   sennosides-docusate sodium 2 tablet Oral Nightly   sodium chloride 3 mL Intravenous Q12H     Continuous Infusions:     Vital signs in last 24 hours:  Temp:  [97.2 °F (36.2 °C)-98.3 °F (36.8 °C)] 97.8 °F (36.6 °C)  Heart Rate:  [] 85  Resp:  [18] 18  BP: ()/(58-73) 97/58    Intake/Output:    Intake/Output Summary (Last 24 hours) at 10/06/18 1231  Last data filed at 10/05/18 1700   Gross per 24 hour   Intake                0 ml   Output              100 ml   Net             -100 ml       Exam:  BP 97/58 (BP Location: Left arm, Patient Position: Sitting)   Pulse 85   Temp 97.8 °F (36.6 °C) (Oral)   Resp 18   Ht 165.1 cm (65\")   Wt 48.5 kg (106 lb 14.4 oz)   SpO2 99%   BMI 17.79 kg/m²     General Appearance:    Alert, cooperative, no distress   Head:    Normocephalic, without obvious abnormality, atraumatic   Eyes:       Throat:   Lips, tongue, gums normal   Neck:   Supple, symmetrical, trachea midline, no JVD   Lungs:     Clear to auscultation bilaterally, respirations unlabored   Chest Wall:    No tenderness or deformity    Heart:    Regular rate and rhythm, S1 and S2 normal, no murmur,no  Rub or gallop   Abdomen:     Soft, non-tender, bowel sounds active, no masses, no organomegaly    Extremities:   Extremities normal, atraumatic, no cyanosis or edema   Pulses:      Skin:   Skin is warm and dry,  no rashes or palpable lesions   Neurologic:   no focal deficits noted      Lab Results (last 72 hours)     Procedure Component Value Units Date/Time    CBC " & Differential [[id number]] Collected:  10/06/18 0538    Specimen:  Blood Updated:  10/06/18 0707    Narrative:       The following orders were created for panel order CBC & Differential.  Procedure                               Abnormality         Status                     ---------                               -----------         ------                     Scan Slide[[id number]]                                                                  CBC Auto Differential[[id number]]        Abnormal            Final result                 Please view results for these tests on the individual orders.    CBC Auto Differential [[id number]]  (Abnormal) Collected:  10/06/18 0538    Specimen:  Blood Updated:  10/06/18 0707     WBC 4.40 (L) 10*3/mm3      RBC 2.99 (L) 10*6/mm3      Hemoglobin 8.3 (L) g/dL      Hematocrit 26.8 (L) %      MCV 89.6 fL      MCH 27.8 pg      MCHC 31.0 (L) g/dL      RDW 16.5 (H) %      RDW-SD 53.5 fl      MPV -- fL      Comment: .        Platelets 53 (L) 10*3/mm3      Neutrophil % 69.4 %      Lymphocyte % 12.7 (L) %      Monocyte % 13.0 (H) %      Eosinophil % 3.6 %      Basophil % 0.2 %      Immature Grans % 1.1 (H) %      Neutrophils, Absolute 3.05 10*3/mm3      Lymphocytes, Absolute 0.56 (L) 10*3/mm3      Monocytes, Absolute 0.57 10*3/mm3      Eosinophils, Absolute 0.16 10*3/mm3      Basophils, Absolute 0.01 10*3/mm3      Immature Grans, Absolute 0.05 (H) 10*3/mm3      nRBC 0.0 /100 WBC     Basic Metabolic Panel [[id number]]  (Abnormal) Collected:  10/06/18 0538    Specimen:  Blood Updated:  10/06/18 0651     Glucose 83 mg/dL      BUN 59 (H) mg/dL      Creatinine 2.95 (H) mg/dL      Sodium 139 mmol/L      Potassium 5.3 (H) mmol/L      Chloride 104 mmol/L      CO2 21.6 (L) mmol/L      Calcium 9.0 mg/dL      eGFR Non African Amer 20 (L) mL/min/1.73      BUN/Creatinine Ratio 20.0     Anion Gap 13.4 mmol/L     Narrative:       The MDRD GFR formula is only valid for adults with stable renal function between  ages 18 and 70.    Troponin [[id number]]  (Abnormal) Collected:  10/06/18 0011    Specimen:  Blood Updated:  10/06/18 0102     Troponin T 0.048 (H) ng/mL     Narrative:       Troponin T Reference Ranges:  Less than 0.03 ng/mL:    Negative for AMI  0.03 to 0.09 ng/mL:      Indeterminant for AMI  Greater than 0.09 ng/mL: Positive for AMI    Troponin [[id number]]  (Abnormal) Collected:  10/05/18 1804    Specimen:  Blood Updated:  10/05/18 1928     Troponin T 0.051 (H) ng/mL     Narrative:       Troponin T Reference Ranges:  Less than 0.03 ng/mL:    Negative for AMI  0.03 to 0.09 ng/mL:      Indeterminant for AMI  Greater than 0.09 ng/mL: Positive for AMI    Troponin [[id number]]  (Abnormal) Collected:  10/05/18 1145    Specimen:  Blood Updated:  10/05/18 1308     Troponin T 0.047 (H) ng/mL     Narrative:       Troponin T Reference Ranges:  Less than 0.03 ng/mL:    Negative for AMI  0.03 to 0.09 ng/mL:      Indeterminant for AMI  Greater than 0.09 ng/mL: Positive for AMI    Protime-INR [[id number]]  (Abnormal) Collected:  10/05/18 1145    Specimen:  Blood Updated:  10/05/18 1217     Protime 19.3 (H) Seconds      INR 1.66 (H)    Vitamin B12 [[id number]]  (Abnormal) Collected:  10/05/18 0325    Specimen:  Blood Updated:  10/05/18 1113     Vitamin B-12 1,472 (H) pg/mL     TSH [[id number]]  (Normal) Collected:  10/05/18 0325    Specimen:  Blood Updated:  10/05/18 1106     TSH 1.210 mIU/mL     Hemoglobin A1c [[id number]]  (Abnormal) Collected:  10/05/18 0325    Specimen:  Blood Updated:  10/05/18 0955     Hemoglobin A1C 5.80 (H) %     Narrative:       Hemoglobin A1C Ranges:    Increased Risk for Diabetes  5.7% to 6.4%  Diabetes                     >= 6.5%  Diabetic Goal                < 7.0%    [place] Draw [[id number]] Collected:  10/05/18 0325    Specimen:  Blood Updated:  10/05/18 0729    Narrative:       The following orders were created for panel order [place] Draw.  Procedure                               Abnormality          Status                     ---------                               -----------         ------                     Light Blue Top[[id number]]                                   Final result               Green Top (Gel)[[id number]]                                                             Lavender Top[[id number]]                                                                Gold Top - SST[[id number]]                                   Final result                 Please view results for these tests on the individual orders.    BNP [[id number]]  (Abnormal) Collected:  10/05/18 0325    Specimen:  Blood Updated:  10/05/18 0450     proBNP 38,044.0 (H) pg/mL     Narrative:       Among patients with dyspnea, NT-proBNP is highly sensitive for the detection of acute congestive heart failure. In addition NT-proBNP of <300 pg/ml effectively rules out acute congestive heart failure with 99% negative predictive value.    Urinalysis With Microscopic If Indicated (No Culture) - Urine, Catheter [[id number]]  (Abnormal) Collected:  10/05/18 0430    Specimen:  Urine from Urine, Catheter Updated:  10/05/18 0444     Color, UA Yellow     Appearance, UA Clear     pH, UA <=5.0     Specific Gravity, UA 1.020     Glucose, UA Negative     Ketones, UA Negative     Bilirubin, UA Negative     Blood, UA Negative     Protein,  mg/dL (2+) (A)     Leuk Esterase, UA Negative     Nitrite, UA Negative     Urobilinogen, UA 0.2 E.U./dL    Urinalysis, Microscopic Only - Urine, Clean Catch [[id number]] Collected:  10/05/18 0430    Specimen:  Urine from Urine, Catheter Updated:  10/05/18 0444     RBC, UA 0-2 /HPF      WBC, UA 0-2 /HPF      Bacteria, UA None Seen /HPF      Squamous Epithelial Cells, UA 0-2 /HPF      Hyaline Casts, UA 3-6 /LPF      Methodology Automated Microscopy    Light Blue Top [[id number]] Collected:  10/05/18 0325    Specimen:  Blood Updated:  10/05/18 0431     Extra Tube hold for add-on     Comment: Auto resulted       Gold Top - SST  [[id number]] Collected:  10/05/18 0325    Specimen:  Blood Updated:  10/05/18 0431     Extra Tube Hold for add-ons.     Comment: Auto resulted.       Troponin [[id number]]  (Abnormal) Collected:  10/05/18 0325    Specimen:  Blood Updated:  10/05/18 0416     Troponin T 0.065 (H) ng/mL     Narrative:       Troponin T Reference Ranges:  Less than 0.03 ng/mL:    Negative for AMI  0.03 to 0.09 ng/mL:      Indeterminant for AMI  Greater than 0.09 ng/mL: Positive for AMI    CBC & Differential [[id number]] Collected:  10/05/18 0325    Specimen:  Blood Updated:  10/05/18 0416    Narrative:       The following orders were created for panel order CBC & Differential.  Procedure                               Abnormality         Status                     ---------                               -----------         ------                     Scan Slide[[id number]]                                       Final result               CBC Auto Differential[[id number]]        Abnormal            Final result                 Please view results for these tests on the individual orders.    CBC Auto Differential [[id number]]  (Abnormal) Collected:  10/05/18 0325    Specimen:  Blood Updated:  10/05/18 0416     WBC 4.43 (L) 10*3/mm3      RBC 3.10 (L) 10*6/mm3      Hemoglobin 8.8 (L) g/dL      Hematocrit 27.8 (L) %      MCV 89.7 fL      MCH 28.4 pg      MCHC 31.7 (L) g/dL      RDW 16.4 (H) %      RDW-SD 53.2 fl      MPV -- fL      Comment:          Platelets 61 (L) 10*3/mm3      Neutrophil % 72.3 %      Lymphocyte % 11.5 (L) %      Monocyte % 12.4 (H) %      Eosinophil % 3.6 %      Basophil % 0.2 %      Immature Grans % 1.4 (H) %      Neutrophils, Absolute 3.20 10*3/mm3      Lymphocytes, Absolute 0.51 (L) 10*3/mm3      Monocytes, Absolute 0.55 10*3/mm3      Eosinophils, Absolute 0.16 10*3/mm3      Basophils, Absolute 0.01 10*3/mm3      Immature Grans, Absolute 0.06 (H) 10*3/mm3      nRBC 0.0 /100 WBC     Scan Slide [[id number]] Collected:  10/05/18  0325    Specimen:  Blood Updated:  10/05/18 0416     Anisocytosis Mod/2+     WBC Morphology Normal     Platelet Morphology Normal    Comprehensive Metabolic Panel [[id number]]  (Abnormal) Collected:  10/05/18 0325    Specimen:  Blood Updated:  10/05/18 0405     Glucose 123 (H) mg/dL      BUN 56 (H) mg/dL      Creatinine 2.96 (H) mg/dL      Sodium 136 mmol/L      Potassium 5.0 mmol/L      Chloride 101 mmol/L      CO2 20.1 (L) mmol/L      Calcium 9.2 mg/dL      Total Protein 6.2 g/dL      Albumin 2.90 (L) g/dL      ALT (SGPT) 20 U/L      AST (SGOT) 56 (H) U/L      Alkaline Phosphatase 613 (H) U/L      Total Bilirubin 0.5 mg/dL      eGFR Non African Amer 20 (L) mL/min/1.73      Globulin 3.3 gm/dL      A/G Ratio 0.9 g/dL      BUN/Creatinine Ratio 18.9     Anion Gap 14.9 mmol/L     Narrative:       The MDRD GFR formula is only valid for adults with stable renal function between ages 18 and 70.    Magnesium [[id number]]  (Normal) Collected:  10/05/18 0325    Specimen:  Blood Updated:  10/05/18 0405     Magnesium 2.0 mg/dL     CK [[id number]]  (Normal) Collected:  10/05/18 0325    Specimen:  Blood Updated:  10/05/18 0405     Creatine Kinase 49 U/L         Data Review:    Results from last 7 days  Lab Units 10/06/18  0538 10/05/18  0325   SODIUM mmol/L 139 136   POTASSIUM mmol/L 5.3* 5.0   CHLORIDE mmol/L 104 101   CO2 mmol/L 21.6* 20.1*   BUN mg/dL 59* 56*   CREATININE mg/dL 2.95* 2.96*   GLUCOSE mg/dL 83 123*   CALCIUM mg/dL 9.0 9.2       Results from last 7 days  Lab Units 10/06/18  0538 10/05/18  0325   WBC 10*3/mm3 4.40* 4.43*   HEMOGLOBIN g/dL 8.3* 8.8*   HEMATOCRIT % 26.8* 27.8*   PLATELETS 10*3/mm3 53* 61*       Results from last 7 days  Lab Units 10/05/18  0325   TSH mIU/mL 1.210       Results from last 7 days  Lab Units 10/05/18  0325   HEMOGLOBIN A1C % 5.80*       Lab Results  Lab Value Date/Time   TROPONINT 0.048 (H) 10/06/2018 0011   TROPONINT 0.051 (H) 10/05/2018 1804   TROPONINT 0.047 (H) 10/05/2018 1145    TROPONINT 0.065 (H) 10/05/2018 0325           Results from last 7 days  Lab Units 10/05/18  0325   ALK PHOS U/L 613*   BILIRUBIN mg/dL 0.5   ALT (SGPT) U/L 20   AST (SGOT) U/L 56*       Results from last 7 days  Lab Units 10/05/18  0325   TSH mIU/mL 1.210       Results from last 7 days  Lab Units 10/05/18  0325   HEMOGLOBIN A1C % 5.80*     No results found for: POCGLU    Results from last 7 days  Lab Units 10/05/18  1145   INR  1.66*       Patient Active Problem List   Diagnosis Code   • Generalized abdominal pain R10.84   • Atrial fibrillation with RVR (CMS/HCC) I48.91   • Acute congestive heart failure (CMS/HCC) I50.9   • Cognitive impairment R41.89   • CKD (chronic kidney disease) N18.9   • Elevated troponin R74.8   • Peripheral vascular disease (CMS/HCC) I73.9   • Cancer (CMS/HCC), prostate C80.1       Assessment:  Active Hospital Problems    Diagnosis Date Noted   • **Atrial fibrillation with RVR (CMS/HCC) [I48.91] 10/05/2018   • Cancer (CMS/HCC), prostate [C80.1] 10/06/2018   • Generalized abdominal pain [R10.84] 10/05/2018   • Acute congestive heart failure (CMS/HCC) [I50.9] 10/05/2018   • Cognitive impairment [R41.89] 10/05/2018   • CKD (chronic kidney disease) [N18.9] 10/05/2018   • Elevated troponin [R74.8] 10/05/2018   • Peripheral vascular disease (CMS/HCC) [I73.9] 10/05/2018      Resolved Hospital Problems    Diagnosis Date Noted Date Resolved   No resolved problems to display.       Plan:  Rate control and follow labs. Social problems , Needs SNU for rehab? Long term care ?? In hospice reportedly    Tomas Ybarra MD  10/6/2018  12:31 PM

## 2018-10-06 NOTE — PROGRESS NOTES
[facility] Visit Report    Herbert Harris  [id number]  10/6/2018    Admission R/T [facility] Dx: NO      Reason for [facility] Admission: CA of the prostate with liver, bile duct and large intestine mets      Symptom  Management: AFIB with AVR, pain control      Nursing/Medication Recommendations: No recommendations at this time      Psychosocial Issues and Recommendations: Provide support to patient and family      Spiritual Concerns and Recommendations: None at present      [facility] Discharge Plans:  None at present, continue to monitor heart rate and rhythm, monitor labs and control pain. Family seeking LTC placement as patient not able to live safely alone and they cannot provide 24 hour care. [facility] SW involved with LTC placement.      Review of Visit: Close monitoring for safety/falls, management of AFIB w/RVR, monitor heart rate and rhythm, monitor labs and pain control, comfort care. Patient lying in bed, dozing. Did not arouse when name called. Spoke to staff RN regarding care needs and patient has received 1 dose of PO Xanax and 2 doses of PO Norco. Also spoke to daughter Kendra on the telephone regarding placement issues, she reports he is not safe at home and I cannot provide 24 hour care for him. [facility] working on placement. Will continue to see daily to assess needs, monitor status and offer support.        Genesis Ojeda RN  [facility] Visit Nurse

## 2018-10-06 NOTE — PLAN OF CARE
Problem: Fall Risk (Adult)  Goal: Identify Related Risk Factors and Signs and Symptoms  Outcome: Ongoing (interventions implemented as appropriate)    Goal: Absence of Fall  Outcome: Ongoing (interventions implemented as appropriate)      Problem: Patient Care Overview  Goal: Plan of Care Review  Outcome: Ongoing (interventions implemented as appropriate)   10/06/18 0615   Coping/Psychosocial   Plan of Care Reviewed With patient   Plan of Care Review   Progress improving   OTHER   Outcome Summary VSS. pain controlled with prn pain medication, no S/S of discomfort or distress. will continue to montior      Goal: Individualization and Mutuality  Outcome: Ongoing (interventions implemented as appropriate)    Goal: Discharge Needs Assessment  Outcome: Ongoing (interventions implemented as appropriate)      Problem: Skin Injury Risk (Adult)  Goal: Identify Related Risk Factors and Signs and Symptoms  Outcome: Ongoing (interventions implemented as appropriate)    Goal: Skin Health and Integrity  Outcome: Ongoing (interventions implemented as appropriate)

## 2018-10-07 PROBLEM — R63.6 UNDERWEIGHT: Status: ACTIVE | Noted: 2018-01-01

## 2018-10-07 PROBLEM — N18.4 STAGE 4 CHRONIC KIDNEY DISEASE (HCC): Status: ACTIVE | Noted: 2018-01-01

## 2018-10-07 PROBLEM — I50.31 ACUTE DIASTOLIC CONGESTIVE HEART FAILURE (HCC): Status: ACTIVE | Noted: 2018-01-01

## 2018-10-07 NOTE — PROGRESS NOTES
"DAILY PROGRESS NOTE  [facility]    Patient Identification:  Name: Herbert Harris  Age: 89 y.o.  Sex: male  :  1929  MRN: [id number]         Primary Care Physician: Provider, No Known    Subjective:  Interval History:He feels bad. Hurting all over.    Objective:    Scheduled Meds:    amLODIPine 10 mg Oral Daily   atenolol 25 mg Oral Daily   bicalutamide 50 mg Oral Daily   haloperidol 0.5 mg Oral 4x Daily   hydrALAZINE 25 mg Oral BID   sennosides-docusate sodium 2 tablet Oral Nightly   sodium chloride 3 mL Intravenous Q12H     Continuous Infusions:     Vital signs in last 24 hours:  Temp:  [97.2 °F (36.2 °C)-101.3 °F (38.5 °C)] 98.1 °F (36.7 °C)  Heart Rate:  [] 106  Resp:  [18] 18  BP: ()/(58-77) 131/63    Intake/Output:    Intake/Output Summary (Last 24 hours) at 10/07/18 1148  Last data filed at 10/07/18 0735   Gross per 24 hour   Intake              240 ml   Output                0 ml   Net              240 ml       Exam:  /63 (BP Location: Left arm, Patient Position: Lying)   Pulse 106   Temp 98.1 °F (36.7 °C) (Oral)   Resp 18   Ht 165.1 cm (65\")   Wt 48.1 kg (106 lb 2.2 oz)   SpO2 99%   BMI 17.66 kg/m²     General Appearance:    Alert, cooperative, no distress   Head:    Normocephalic, without obvious abnormality, atraumatic   Eyes:       Throat:   Lips, tongue, gums normal   Neck:   Supple, symmetrical, trachea midline, no JVD   Lungs:     Clear to auscultation bilaterally, respirations unlabored   Chest Wall:    No tenderness or deformity    Heart:    Regular rate and rhythm, S1 and S2 normal, no murmur,no  Rub or gallop   Abdomen:     Soft, non-tender, bowel sounds active, no masses, no organomegaly    Extremities:   Extremities normal, atraumatic, no cyanosis or edema   Pulses:      Skin:   Skin is warm and dry,  no rashes or palpable lesions   Neurologic:   no focal deficits noted      Lab Results (last 72 hours)     Procedure Component Value Units Date/Time    " CBC & Differential [[id number]] Collected:  10/06/18 0538    Specimen:  Blood Updated:  10/06/18 0707    Narrative:       The following orders were created for panel order CBC & Differential.  Procedure                               Abnormality         Status                     ---------                               -----------         ------                     Scan Slide[[id number]]                                                                  CBC Auto Differential[[id number]]        Abnormal            Final result                 Please view results for these tests on the individual orders.    CBC Auto Differential [[id number]]  (Abnormal) Collected:  10/06/18 0538    Specimen:  Blood Updated:  10/06/18 0707     WBC 4.40 (L) 10*3/mm3      RBC 2.99 (L) 10*6/mm3      Hemoglobin 8.3 (L) g/dL      Hematocrit 26.8 (L) %      MCV 89.6 fL      MCH 27.8 pg      MCHC 31.0 (L) g/dL      RDW 16.5 (H) %      RDW-SD 53.5 fl      MPV -- fL      Comment: .        Platelets 53 (L) 10*3/mm3      Neutrophil % 69.4 %      Lymphocyte % 12.7 (L) %      Monocyte % 13.0 (H) %      Eosinophil % 3.6 %      Basophil % 0.2 %      Immature Grans % 1.1 (H) %      Neutrophils, Absolute 3.05 10*3/mm3      Lymphocytes, Absolute 0.56 (L) 10*3/mm3      Monocytes, Absolute 0.57 10*3/mm3      Eosinophils, Absolute 0.16 10*3/mm3      Basophils, Absolute 0.01 10*3/mm3      Immature Grans, Absolute 0.05 (H) 10*3/mm3      nRBC 0.0 /100 WBC     Basic Metabolic Panel [[id number]]  (Abnormal) Collected:  10/06/18 0538    Specimen:  Blood Updated:  10/06/18 0651     Glucose 83 mg/dL      BUN 59 (H) mg/dL      Creatinine 2.95 (H) mg/dL      Sodium 139 mmol/L      Potassium 5.3 (H) mmol/L      Chloride 104 mmol/L      CO2 21.6 (L) mmol/L      Calcium 9.0 mg/dL      eGFR Non African Amer 20 (L) mL/min/1.73      BUN/Creatinine Ratio 20.0     Anion Gap 13.4 mmol/L     Narrative:       The MDRD GFR formula is only valid for adults with stable renal function  between ages 18 and 70.    Troponin [[id number]]  (Abnormal) Collected:  10/06/18 0011    Specimen:  Blood Updated:  10/06/18 0102     Troponin T 0.048 (H) ng/mL     Narrative:       Troponin T Reference Ranges:  Less than 0.03 ng/mL:    Negative for AMI  0.03 to 0.09 ng/mL:      Indeterminant for AMI  Greater than 0.09 ng/mL: Positive for AMI    Troponin [[id number]]  (Abnormal) Collected:  10/05/18 1804    Specimen:  Blood Updated:  10/05/18 1928     Troponin T 0.051 (H) ng/mL     Narrative:       Troponin T Reference Ranges:  Less than 0.03 ng/mL:    Negative for AMI  0.03 to 0.09 ng/mL:      Indeterminant for AMI  Greater than 0.09 ng/mL: Positive for AMI    Troponin [[id number]]  (Abnormal) Collected:  10/05/18 1145    Specimen:  Blood Updated:  10/05/18 1308     Troponin T 0.047 (H) ng/mL     Narrative:       Troponin T Reference Ranges:  Less than 0.03 ng/mL:    Negative for AMI  0.03 to 0.09 ng/mL:      Indeterminant for AMI  Greater than 0.09 ng/mL: Positive for AMI    Protime-INR [[id number]]  (Abnormal) Collected:  10/05/18 1145    Specimen:  Blood Updated:  10/05/18 1217     Protime 19.3 (H) Seconds      INR 1.66 (H)    Vitamin B12 [[id number]]  (Abnormal) Collected:  10/05/18 0325    Specimen:  Blood Updated:  10/05/18 1113     Vitamin B-12 1,472 (H) pg/mL     TSH [[id number]]  (Normal) Collected:  10/05/18 0325    Specimen:  Blood Updated:  10/05/18 1106     TSH 1.210 mIU/mL     Hemoglobin A1c [[id number]]  (Abnormal) Collected:  10/05/18 0325    Specimen:  Blood Updated:  10/05/18 0955     Hemoglobin A1C 5.80 (H) %     Narrative:       Hemoglobin A1C Ranges:    Increased Risk for Diabetes  5.7% to 6.4%  Diabetes                     >= 6.5%  Diabetic Goal                < 7.0%    [place] Draw [[id number]] Collected:  10/05/18 0325    Specimen:  Blood Updated:  10/05/18 0729    Narrative:       The following orders were created for panel order [place] Draw.  Procedure                               Abnormality          Status                     ---------                               -----------         ------                     Light Blue Top[[id number]]                                   Final result               Green Top (Gel)[[id number]]                                                             Lavender Top[[id number]]                                                                Gold Top - SST[[id number]]                                   Final result                 Please view results for these tests on the individual orders.    BNP [[id number]]  (Abnormal) Collected:  10/05/18 0325    Specimen:  Blood Updated:  10/05/18 0450     proBNP 38,044.0 (H) pg/mL     Narrative:       Among patients with dyspnea, NT-proBNP is highly sensitive for the detection of acute congestive heart failure. In addition NT-proBNP of <300 pg/ml effectively rules out acute congestive heart failure with 99% negative predictive value.    Urinalysis With Microscopic If Indicated (No Culture) - Urine, Catheter [[id number]]  (Abnormal) Collected:  10/05/18 0430    Specimen:  Urine from Urine, Catheter Updated:  10/05/18 0444     Color, UA Yellow     Appearance, UA Clear     pH, UA <=5.0     Specific Gravity, UA 1.020     Glucose, UA Negative     Ketones, UA Negative     Bilirubin, UA Negative     Blood, UA Negative     Protein,  mg/dL (2+) (A)     Leuk Esterase, UA Negative     Nitrite, UA Negative     Urobilinogen, UA 0.2 E.U./dL    Urinalysis, Microscopic Only - Urine, Clean Catch [[id number]] Collected:  10/05/18 0430    Specimen:  Urine from Urine, Catheter Updated:  10/05/18 0444     RBC, UA 0-2 /HPF      WBC, UA 0-2 /HPF      Bacteria, UA None Seen /HPF      Squamous Epithelial Cells, UA 0-2 /HPF      Hyaline Casts, UA 3-6 /LPF      Methodology Automated Microscopy    Light Blue Top [[id number]] Collected:  10/05/18 0325    Specimen:  Blood Updated:  10/05/18 0431     Extra Tube hold for add-on     Comment: Auto resulted       Gold Top -  SST [[id number]] Collected:  10/05/18 0325    Specimen:  Blood Updated:  10/05/18 0431     Extra Tube Hold for add-ons.     Comment: Auto resulted.       Troponin [[id number]]  (Abnormal) Collected:  10/05/18 0325    Specimen:  Blood Updated:  10/05/18 0416     Troponin T 0.065 (H) ng/mL     Narrative:       Troponin T Reference Ranges:  Less than 0.03 ng/mL:    Negative for AMI  0.03 to 0.09 ng/mL:      Indeterminant for AMI  Greater than 0.09 ng/mL: Positive for AMI    CBC & Differential [[id number]] Collected:  10/05/18 0325    Specimen:  Blood Updated:  10/05/18 0416    Narrative:       The following orders were created for panel order CBC & Differential.  Procedure                               Abnormality         Status                     ---------                               -----------         ------                     Scan Slide[[id number]]                                       Final result               CBC Auto Differential[[id number]]        Abnormal            Final result                 Please view results for these tests on the individual orders.    CBC Auto Differential [[id number]]  (Abnormal) Collected:  10/05/18 0325    Specimen:  Blood Updated:  10/05/18 0416     WBC 4.43 (L) 10*3/mm3      RBC 3.10 (L) 10*6/mm3      Hemoglobin 8.8 (L) g/dL      Hematocrit 27.8 (L) %      MCV 89.7 fL      MCH 28.4 pg      MCHC 31.7 (L) g/dL      RDW 16.4 (H) %      RDW-SD 53.2 fl      MPV -- fL      Comment:          Platelets 61 (L) 10*3/mm3      Neutrophil % 72.3 %      Lymphocyte % 11.5 (L) %      Monocyte % 12.4 (H) %      Eosinophil % 3.6 %      Basophil % 0.2 %      Immature Grans % 1.4 (H) %      Neutrophils, Absolute 3.20 10*3/mm3      Lymphocytes, Absolute 0.51 (L) 10*3/mm3      Monocytes, Absolute 0.55 10*3/mm3      Eosinophils, Absolute 0.16 10*3/mm3      Basophils, Absolute 0.01 10*3/mm3      Immature Grans, Absolute 0.06 (H) 10*3/mm3      nRBC 0.0 /100 WBC     Scan Slide [[id number]] Collected:   10/05/18 0325    Specimen:  Blood Updated:  10/05/18 0416     Anisocytosis Mod/2+     WBC Morphology Normal     Platelet Morphology Normal    Comprehensive Metabolic Panel [[id number]]  (Abnormal) Collected:  10/05/18 0325    Specimen:  Blood Updated:  10/05/18 0405     Glucose 123 (H) mg/dL      BUN 56 (H) mg/dL      Creatinine 2.96 (H) mg/dL      Sodium 136 mmol/L      Potassium 5.0 mmol/L      Chloride 101 mmol/L      CO2 20.1 (L) mmol/L      Calcium 9.2 mg/dL      Total Protein 6.2 g/dL      Albumin 2.90 (L) g/dL      ALT (SGPT) 20 U/L      AST (SGOT) 56 (H) U/L      Alkaline Phosphatase 613 (H) U/L      Total Bilirubin 0.5 mg/dL      eGFR Non African Amer 20 (L) mL/min/1.73      Globulin 3.3 gm/dL      A/G Ratio 0.9 g/dL      BUN/Creatinine Ratio 18.9     Anion Gap 14.9 mmol/L     Narrative:       The MDRD GFR formula is only valid for adults with stable renal function between ages 18 and 70.    Magnesium [[id number]]  (Normal) Collected:  10/05/18 0325    Specimen:  Blood Updated:  10/05/18 0405     Magnesium 2.0 mg/dL     CK [[id number]]  (Normal) Collected:  10/05/18 0325    Specimen:  Blood Updated:  10/05/18 0405     Creatine Kinase 49 U/L         Data Review:    Results from last 7 days  Lab Units 10/07/18  0756 10/06/18  0538 10/05/18  0325   SODIUM mmol/L 136 139 136   POTASSIUM mmol/L 5.1 5.3* 5.0   CHLORIDE mmol/L 102 104 101   CO2 mmol/L 21.5* 21.6* 20.1*   BUN mg/dL 65* 59* 56*   CREATININE mg/dL 3.19* 2.95* 2.96*   GLUCOSE mg/dL 90 83 123*   CALCIUM mg/dL 8.7 9.0 9.2       Results from last 7 days  Lab Units 10/07/18  0756 10/06/18  0538 10/05/18  0325   WBC 10*3/mm3 5.26 4.40* 4.43*   HEMOGLOBIN g/dL 8.2* 8.3* 8.8*   HEMATOCRIT % 26.1* 26.8* 27.8*   PLATELETS 10*3/mm3 65* 53* 61*       Results from last 7 days  Lab Units 10/05/18  0325   TSH mIU/mL 1.210       Results from last 7 days  Lab Units 10/05/18  0325   HEMOGLOBIN A1C % 5.80*       Lab Results  Lab Value Date/Time   TROPONINT 0.048 (H)  10/06/2018 0011   TROPONINT 0.051 (H) 10/05/2018 1804   TROPONINT 0.047 (H) 10/05/2018 1145   TROPONINT 0.065 (H) 10/05/2018 0325           Results from last 7 days  Lab Units 10/05/18  0325   ALK PHOS U/L 613*   BILIRUBIN mg/dL 0.5   ALT (SGPT) U/L 20   AST (SGOT) U/L 56*       Results from last 7 days  Lab Units 10/05/18  0325   TSH mIU/mL 1.210       Results from last 7 days  Lab Units 10/05/18  0325   HEMOGLOBIN A1C % 5.80*     Glucose   Date/Time Value Ref Range Status   10/07/2018 0611 101 70 - 130 mg/dL Final       Results from last 7 days  Lab Units 10/05/18  1145   INR  1.66*       Patient Active Problem List   Diagnosis Code   • Generalized abdominal pain R10.84   • Atrial fibrillation with RVR (CMS/HCC) I48.91   • Acute congestive heart failure (CMS/HCC) I50.9   • Cognitive impairment R41.89   • CKD (chronic kidney disease) N18.9   • Elevated troponin R74.8   • Peripheral vascular disease (CMS/HCC) I73.9   • Cancer (CMS/HCC), prostate C80.1       Assessment:  Active Hospital Problems    Diagnosis Date Noted   • **Atrial fibrillation with RVR (CMS/HCC) [I48.91] 10/05/2018   • Cancer (CMS/HCC), prostate [C80.1] 10/06/2018   • Generalized abdominal pain [R10.84] 10/05/2018   • Acute congestive heart failure (CMS/HCC) [I50.9] 10/05/2018   • Cognitive impairment [R41.89] 10/05/2018   • CKD (chronic kidney disease) [N18.9] 10/05/2018   • Elevated troponin [R74.8] 10/05/2018   • Peripheral vascular disease (CMS/HCC) [I73.9] 10/05/2018      Resolved Hospital Problems    Diagnosis Date Noted Date Resolved   No resolved problems to display.       Plan:  Rate control and follow labs. Social problems , Needs SNU for rehab? Long term care ?? In hospice reportedly. Maybe tomorrow?    Tomas Ybarra MD  10/7/2018  11:48 AM

## 2018-10-07 NOTE — THERAPY EVALUATION
Acute Care - Physical Therapy Initial Evaluation  [facility]     Patient Name: Herbert Harris  : 1929  MRN: [id number]  Today's Date: 10/7/2018   Onset of Illness/Injury or Date of Surgery: 10/05/18  Date of Referral to PT: 10/06/18  Referring Physician: TOSHIA Ybarra M.D.      Admit Date: 10/5/2018    Visit Dx:     ICD-10-CM ICD-9-CM   1. Atrial fibrillation with RVR (CMS/HCC) I48.91 427.31   2. Acute on chronic diastolic congestive heart failure (CMS/HCC) I50.33 428.33     428.0   3. Anemia, unspecified type D64.9 285.9   4. Caregiver not readily available Z74.8 V60.89     Patient Active Problem List   Diagnosis   • Generalized abdominal pain   • Atrial fibrillation with RVR (CMS/HCC)   • Acute congestive heart failure (CMS/HCC)   • Cognitive impairment   • CKD (chronic kidney disease)   • Elevated troponin   • Peripheral vascular disease (CMS/HCC)   • Cancer (CMS/HCC), prostate     Past Medical History:   Diagnosis Date   • Atrial fibrillation (CMS/HCC)    • Cancer (CMS/HCC)     prostate with mets   • Cognitive impairment    • Hearing loss    • Normocytic normochromic anemia    • Spinal stenosis of lumbar region    • Spondylolisthesis at L5-S1 level      No past surgical history on file.     PT ASSESSMENT (last 12 hours)      Physical Therapy Evaluation     Row Name 10/07/18 0900          PT Evaluation Time/Intention    Subjective Information no complaints  -MP     Document Type evaluation  -MP     Mode of Treatment physical therapy  -MP     Total Evaluation Minutes, Physical Therapy 30  -MP     Patient Effort good   Mr. Harris seemed angry, intermittently, during treatment.  -MP     Row Name 10/07/18 09          General Information    Patient Profile Reviewed? yes  -MP     Onset of Illness/Injury or Date of Surgery 10/05/18  -MP     Referring Physician TOSHIA Ybarra M.D.  -MP     Patient Observations alert;agree to therapy   [place]  -MP     Equipment Currently Used at Home wheelchair  -MP     Pertinent History of  Current Functional Problem Mr. Harris was hospitalized on October 5, 2018 secondary to A fiibrillation and abdominal pain.  -     Row Name 10/07/18 0900          Relationship/Environment    Lives With alone  -[facility] Name 10/07/18 0900          Resource/Environmental Concerns    Current Living Arrangements home/apartment/condo  -[facility] Name 10/07/18 0900          Cognitive Assessment/Intervention- PT/OT    Orientation Status (Cognition) oriented x 3  -MP     Safety Deficit (Cognitive) mild deficit  -[facility] Name 10/07/18 0900          Bed Mobility Assessment/Treatment    Bed Mobility Assessment/Treatment rolling right;supine-sit;sit-supine  -MP     Rolling Right [place] (Bed Mobility) minimum assist (75% patient effort)  -MP     Supine-Sit [place] (Bed Mobility) minimum assist (75% patient effort)  -MP     Sit-Supine [place] (Bed Mobility) minimum assist (75% patient effort)  -     Bed Mobility, Safety Issues decreased use of arms for pushing/pulling;decreased use of legs for bridging/pushing  -     Assistive Device (Bed Mobility) bed rails;draw sheet  -[facility] Name 10/07/18 0900          Transfer Assessment/Treatment    Maintains Weight-bearing Status (Transfers) able to maintain  -MP     Row Name 10/07/18 0900          Gait/Stairs Assessment/Training    92321 - Gait Training Minutes  15  -MP     [place] Level (Gait) contact guard  -     Assistive Device (Gait) walker, front-wheeled  -     Distance in Feet (Gait) 70  -MP     Comment (Gait/Stairs) He ambulated with kyphotic posturing at a slow, but steady pace.  He mostly used a step through stride but not at all times.  -     Row Name 10/07/18 0900          General ROM    GENERAL ROM COMMENTS Minimal losses at each joint in his extremities.  -     Row Name 10/07/18 0900          MMT (Manual Muscle Testing)    General MMT Comments All extremities 4-/5,  fair+ B  -     Row Name             Wound 10/05/18 0830 coccyx  pressure injury    Wound - Properties Group Date first assessed: 10/05/18  -HC Time first assessed: 0830  -HC Present On Admission : yes  -HC Location: coccyx  -HC Type: pressure injury  -HC    Row Name 10/07/18 0900          Plan of Care Review    Plan of Care Reviewed With patient  -MP     Row Name 10/07/18 0900          Physical Therapy Clinical Impression    Date of Referral to PT 10/06/18  -MP     PT Diagnosis (PT Clinical Impression) Generalized weakness, difficulty walking, immobility syndrome  -MP     Prognosis (PT Clinical Impression) Good but needs encouragement to work.    -MP     Functional Level at Time of Evaluation (PT Clinical Impression) Dependent for bed mobility, transfers and gait.  -MP     Patient/Family Goals Statement (PT Clinical Impression) He eventually wants to return home.  -MP     Criteria for Skilled Interventions Met (PT Clinical Impression) yes;treatment indicated  -MP     Impairments Found (describe specific impairments) gait, locomotion, and balance  -MP     Predicted Duration of Therapy (PT) 3-4 days  -MP     Row Name 10/07/18 0900          Physical Therapy Goals    Bed Mobility Goal Selection (PT) bed mobility, PT goal 1  -MP     Transfer Goal Selection (PT) transfer, PT goal 1  -MP     Gait Training Goal Selection (PT) gait training, PT goal 1  -MP     Row Name 10/07/18 0900          Bed Mobility Goal 1 (PT)    Activity/Assistive Device (Bed Mobility Goal 1, PT) bed mobility activities, all  -MP     [place] Level/Cues Needed (Bed Mobility Goal 1, PT) independent  -MP     Time Frame (Bed Mobility Goal 1, PT) 4 days  -MP     Row Name 10/07/18 0900          Transfer Goal 1 (PT)    Activity/Assistive Device (Transfer Goal 1, PT) transfers, all  -MP     [place] Level/Cues Needed (Transfer Goal 1, PT) supervision required  -MP     Time Frame (Transfer Goal 1, PT) 4 days  -MP     Row Name 10/07/18 0900          Gait Training Goal 1 (PT)    Activity/Assistive Device (Gait  Training Goal 1, PT) gait (walking locomotion);assistive device use  -MP     [place] Level (Gait Training Goal 1, PT) contact guard assist  -MP     Distance (Gait Goal 1, PT) 150  -MP     Time Frame (Gait Training Goal 1, PT) 4 days  -MP     Row Name 10/07/18 0900          Positioning and Restraints    Pre-Treatment Position in bed  -MP     Post Treatment Position bed  -MP     In Bed notified nsg;supine;call light within reach;encouraged to call for assist;exit alarm on  -MP       User Key  (r) = Recorded By, (t) = Taken By, (c) = Cosigned By    Initials Name Provider Type    MP Simón Perry, PT Physical Therapist    Analia Ibarra RN Registered Nurse          Physical Therapy Education     Title: PT OT SLP Therapies (Done)     Topic: Physical Therapy (Done)     Point: Mobility training (Done)    Learning Progress Summary     Learner Status Readiness Method Response Comment Documented by    Patient Done Acceptance [facility] 10/07/18 0939          Point: Home exercise program (Done)    Learning Progress Summary     Learner Status Readiness Method Response Comment Documented by    Patient Done Acceptance [facility] 10/07/18 0939          Point: Body mechanics (Done)    Learning Progress Summary     Learner Status Readiness Method Response Comment Documented by    Patient Done Acceptance [facility] 10/07/18 0939          Point: Precautions (Done)    Learning Progress Summary     Learner Status Readiness Method Response Comment Documented by    Patient Done Acceptance [facility] 10/07/18 0939                      User Key     Initials Effective Dates Name Provider Type Discipline     06/22/16 -  Simón Perry, PT Physical Therapist PT                PT Recommendation and Plan  Anticipated Discharge Disposition (PT): inpatient rehabilitation facility  Planned Therapy Interventions (PT Eval): bed mobility training, gait training, patient/family education, transfer training  Therapy Frequency (PT Clinical  Impression): daily  Outcome Summary/Treatment Plan (PT)  Anticipated Discharge Disposition (PT): inpatient rehabilitation facility  Plan of Care Reviewed With: patient  Progress: improving  Outcome Summary: Mr. Harris ambulated 70 feet, front wheeled walker, contact guard of one.  He is agitated, but with encouragement worked better than expected.          Outcome Measures     Row Name 10/07/18 0900             How much help from another person do you currently need...    Turning from your back to your side while in flat bed without using bedrails? 3  -MP      Moving from lying on back to sitting on the side of a flat bed without bedrails? 3  -MP      Moving to and from a bed to a chair (including a wheelchair)? 3  -MP      Standing up from a chair using your arms (e.g., wheelchair, bedside chair)? 3  -MP      Climbing 3-5 steps with a railing? 2  -MP      To walk in hospital room? 3  -MP      AM-PAC 6 Clicks Score 17  -MP         Functional Assessment    Outcome Measure Options AM-PAC 6 Clicks Basic Mobility (PT)  -MP        User Key  (r) = Recorded By, (t) = Taken By, (c) = Cosigned By    Initials Name Provider Type    Simón Camacho PT Physical Therapist           Time Calculation:         PT Charges     Row Name 10/07/18 0941 10/07/18 0900          Time Calculation    Start Time 0910  -MP  --     Stop Time 0940  -MP  --     Time Calculation (min) 30 min  -MP  --        Timed Charges    67960 - Gait Training Minutes   -- 15  -MP       User Key  (r) = Recorded By, (t) = Taken By, (c) = Cosigned By    Initials Name Provider Type    Simón Camacho PT Physical Therapist        Therapy Suggested Charges     Code   Minutes Charges    18937 (CPT®) Hc Pt Neuromusc Re Education Ea 15 Min      17487 (CPT®) Hc Pt Ther Proc Ea 15 Min      05930 (CPT®) Hc Gait Training Ea 15 Min 15 1    34155 (CPT®) Hc Pt Therapeutic Act Ea 15 Min      08341 (CPT®) Hc Pt Manual Therapy Ea 15 Min      07809 (CPT®) Hc Pt Iontophoresis Ea 15  Min      06992 (CPT®) Hc Pt Elec Stim Ea-Per 15 Min      35358 (CPT®) Hc Pt Ultrasound Ea 15 Min      59402 (CPT®) Hc Pt Self Care/Mgmt/Train Ea 15 Min      10458 (CPT®) Hc Pt Prosthetic (S) Train Initial Encounter, Each 15 Min      90022 (CPT®) Hc Pt Orthotic(S)/Prosthetic(S) Encounter, Each 15 Min      40217 (CPT®) Hc Orthotic(S) Mgmt/Train Initial Encounter, Each 15min      Total  15 1        Therapy Charges for Today     Code Description Service Date Service Provider Modifiers Qty    63063851567 HC GAIT TRAINING EA 15 MIN 10/7/2018 Simón Perry, PT GP 1    87383852630 HC PT EVAL MOD COMPLEXITY 1 10/7/2018 Simón Perry, PT GP 1          PT G-Codes  Outcome Measure Options: AM-PAC 6 Clicks Basic Mobility (PT)  AM-PAC 6 Clicks Score: 17      Simón Perry PT  10/7/2018

## 2018-10-07 NOTE — PLAN OF CARE
Problem: Patient Care Overview  Goal: Plan of Care Review  Outcome: Ongoing (interventions implemented as appropriate)   10/07/18 0515   Coping/Psychosocial   Plan of Care Reviewed With patient   Plan of Care Review   Progress no change   OTHER   Outcome Summary VSS, pt behavior aggressive towards staff and sexually inappropriate requiring redirection, pt informed if behavior continues that security will be notified, no further epsiodes noted. blood and clots noted in brief at 0200 and 0500, Dr. Weems ordered PVR check at 0915 and if retention noted to call [facility]. will cont to monitor

## 2018-10-07 NOTE — PLAN OF CARE
Problem: Patient Care Overview  Goal: Plan of Care Review   10/07/18 0939   Coping/Psychosocial   Plan of Care Reviewed With patient   Plan of Care Review   Progress improving   OTHER   Outcome Summary Mr. Harris ambulated 70 feet, front wheeled walker, contact guard of one. He is agitated, but with encouragement worked better than expected.

## 2018-10-07 NOTE — PROGRESS NOTES
"[facility] Visit Report    Herbert Harris  [id number]  10/7/2018    Admission R/T [facility] Dx: NO      Reason for [facility] Admission: CA of the prostate with liver, bide duct and large intestine mets      Symptom  Management: AFIB w/ RVR, pain control, monitor labs      Nursing/Medication Recommendations: No recommendations at this time      Psychosocial Issues and Recommendations: Provide support to patient and family      Spiritual Concerns and Recommendations: None at present      [facility] Discharge Plans:  PT evaluation this am for [facility]. Continue to monitor heart rate and rhythm, labs, pain control. Will discuss with CCP regarding possible transfer to [facility] when discharged.      Review of Visit: Close monitoring for safety/falls, management of pain, AFIB w/ RVR, labs, comfort care. Patient awake lying in bed, alert and able to answer questions appropriately. Patient received Xanax and Norco this am.  PT worked with patient today and he was able to ambulate with assistance. Spoke to patient regarding possible discharge to skilled rehab and he was agreeable to this if it would help \"to get me back home\". Emotional support provided. Discussed with staff and CCP regarding possible transfer to [facility] when discharged and they are following up. Called and spoke to daughter regarding this plan and she is going to call BEVERLY Romero to discuss. Explained to her that patient will have to revoke from [facility] if going to [facility] and can be reevaluated when rehab completed to be readmitted to [facility]. Will continue to see patient to assess needs, monitor status and offer support.        Genesis Ojeda RN  [facility] Visit Nurse  "

## 2018-10-07 NOTE — PLAN OF CARE
Problem: Fall Risk (Adult)  Goal: Identify Related Risk Factors and Signs and Symptoms  Outcome: Ongoing (interventions implemented as appropriate)    Goal: Absence of Fall  Outcome: Ongoing (interventions implemented as appropriate)      Problem: Patient Care Overview  Goal: Plan of Care Review  Outcome: Ongoing (interventions implemented as appropriate)   10/07/18 1543   Coping/Psychosocial   Plan of Care Reviewed With patient   Plan of Care Review   Progress improving   OTHER   Outcome Summary VSS. pain controlled with prn pain medication, no S/S of discomfort or distress will continue to monitor      Goal: Individualization and Mutuality  Outcome: Ongoing (interventions implemented as appropriate)    Goal: Discharge Needs Assessment  Outcome: Ongoing (interventions implemented as appropriate)    Goal: Interprofessional Rounds/Family Conf  Outcome: Ongoing (interventions implemented as appropriate)      Problem: Skin Injury Risk (Adult)  Goal: Identify Related Risk Factors and Signs and Symptoms  Outcome: Ongoing (interventions implemented as appropriate)    Goal: Skin Health and Integrity  Outcome: Ongoing (interventions implemented as appropriate)

## 2018-10-07 NOTE — DISCHARGE PLACEMENT REQUEST
"Di Harris  (89 y.o. Male)     Date of Birth Social Security Number Address Home Phone MRN    08/11/1929  [address]  [place] [address] [phone] [id number]    Judaism Marital Status          None        Admission Date Admission Type Admitting Provider Attending Provider Department, Room/Bed    10/5/18 Emergency Aleks Butler MD Beard, Lyle E, MD [address], E[address]/1    Discharge Date Discharge Disposition Discharge Destination                       Attending Provider:  Tomas Ybarra MD    Allergies:  No Known Allergies    Isolation:  None   Infection:  None   Code Status:  CPR    Ht:  165.1 cm (65\")   Wt:  48.1 kg (106 lb 2.2 oz)    Admission Cmt:  None   Principal Problem:  Atrial fibrillation with RVR (CMS/HCC) [I48.91]                 Active Insurance as of 10/5/2018     Primary Coverage     Payor Plan Insurance Group Employer/Plan Group    MEDICARE MEDICARE A & B      Payor Plan Address Payor Plan Phone Number Effective From Effective To    [address] [phone] 8/1/1994     [facility] [id number]       Subscriber Name Subscriber Birth Date Member ID       DI HARRIS 8/11/1929 [id number]                 Emergency Contacts      (Rel.) Home Phone Work Phone Mobile Phone    Kendra Callahan (Daughter) [phone] -- [phone]              "

## 2018-10-07 NOTE — PROGRESS NOTES
Discharge Planning Assessment  [facility]     Patient Name: Herbert Harris  MRN: [id number]  Today's Date: 10/7/2018    Admit Date: 10/5/2018          Discharge Needs Assessment    No documentation.             Discharge Plan     Row Name 10/07/18 1208       Plan    Plan Comments The patient's daughter and patient also would like for [facility] that just opened to evaluate also. Placed call to Giovanna Fernandez [phone] and left her a voice mail to call CCP in the AM and left us know where to fax infor and verify that they can accept short term rehab. MARTINE Cruz RN, CCP.    Row Name 10/07/18 120       Plan    Plan Comments Received call from Kendra/Daughter which you have to dial area code [phone] or her line rings busy. Per Kendra she can provide the transportation after 5:30 PM to whichever SNU accepts.  Kendra works during the day and states you can leave a message and she will calll you back. MARTINE Cruz RN, CCP    Row Name 10/07/18 5565       Plan    Plan Comments Spoke with Genesis/[place]/RN regarding the discharge plan options. The patient is agreeable to going to SNU for short term rehab in the [facility]. After speaking with the patient he standed agreeable to [facility] and she will eval, [email or web address]Bernie FARLEY left message, [facility] Francisco and she will evaluate and Og Cardoso/Sanjuana left message for them all to evaluate for short rehab then home with [facility]. Tried to call Kendra Callahan/daughter [phone] and her line was busy. Notified Genesis with [facility]/RN if she talks with her to have her call CCP to verify that she can provide the transportation to the facility at discharge. MARTINE Cruz RN, CCP.         Destination     Service Request Status Selected Specialties Address Phone Number Fax Number    [facility] Pending - Request Sent N/A [address] [facility] [address] [phone]    [facility]  Pending - Request Sent N/A [address] [facility] [address] [phone]    [facility] Pending - Request Sent N/A [address] [facility] [address] [phone]    [facility] Pending - Request Sent N/A [address] [facility] [address] [phone]      Durable Medical Equipment     No service coordination in this encounter.      Dialysis/Infusion     No service coordination in this encounter.      Home Medical Care     No service coordination in this encounter.      Social Care     No service coordination in this encounter.                Demographic Summary    No documentation.           Functional Status    No documentation.           Psychosocial    No documentation.           Abuse/Neglect    No documentation.           Legal    No documentation.           Substance Abuse    No documentation.           Patient Forms    No documentation.         Juliet Cruz RN

## 2018-10-07 NOTE — PROGRESS NOTES
Discharge Planning Assessment  [facility]     Patient Name: Herbert Harris  MRN: [id number]  Today's Date: 10/7/2018    Admit Date: 10/5/2018          Discharge Needs Assessment    No documentation.             Discharge Plan     Row Name 10/07/18 1202       Plan    Plan Comments Received call from Kendra/Daughter which you have to dial area code [phone] or her line rings busy. Per Kendra she can provide the transportation after 5:30 PM to whichever SNU accepts.  Kendra works during the day and states you can leave a message and she will calll you back. MARTINE Cruz RN, CCP    Row Name 10/07/18 1142       Plan    Plan Comments Spoke with Genesis/[place]/RN regarding the discharge plan options. The patient is agreeable to going to SNU for short term rehab in the [facility]. After speaking with the patient he standed agreeable to [facility] and she will eval, [facility]/Bernie B. left message, [facility] Francisco and she will evaluate and [facility] Cardoso/Sanjuana left message for them all to evaluate for short rehab then home with [facility]. Tried to call Kendra Callahan/daughter [phone] and her line was busy. Notified Genesis with [facility]/RN if she talks with her to have her call CCP to verify that she can provide the transportation to the facility at discharge. MARTINE Cruz RN, CCP.         Destination     Service Request Status Selected Specialties Address Phone Number Fax Number    [facility] Pending - Request Sent N/A [address] [facility] [address] [phone]    [facility] Pending - Request Sent N/A [address] [facility] [address] [phone]    [facility] - Request Sent N/A [address] [facility] [address] [phone]    [facility] Pending - Request Sent N/A [address] [facility] [address] [phone]  [phone]      Durable Medical Equipment     No service coordination in this encounter.      Dialysis/Infusion     No service coordination in this encounter.      Home Medical Care     No service coordination in this encounter.      Social Care     No service coordination in this encounter.                Demographic Summary    No documentation.           Functional Status    No documentation.           Psychosocial    No documentation.           Abuse/Neglect    No documentation.           Legal    No documentation.           Substance Abuse    No documentation.           Patient Forms    No documentation.         Juliet Cruz RN

## 2018-10-07 NOTE — PROGRESS NOTES
Discharge Planning Assessment  [facility]     Patient Name: Herbert Harris  MRN: [id number]  Today's Date: 10/7/2018    Admit Date: 10/5/2018          Discharge Needs Assessment    No documentation.             Discharge Plan     Row Name 10/07/18 1141       Plan    Plan Comments Spoke with Genesis/[facility]/RN regarding the discharge plan options. The patient is agreeable to going to [facility] for short term rehab in the [facility] area. After speaking with the patient he standed agreeable to Milagros/Selin and she will eval, [facility]/Bernie FARLEY left message, [facility]/Jose Francisco and she will evaluate and Ruston/Sanjuana left message for them all to evaluate for short rehab then home with [facility]. Tried to call Kendra Callahan/daughter [phone] and her line was busy. Notified Genesis with [facility]/RN if she talks with her to have her call CCP to verify that she can provide the transportation to the facility at discharge. MARTINE Cruz RN, CCP.         Destination     Service Request Status Selected Specialties Address Phone Number Fax Number    [facility] Pending - Request Sent N/A [address] [facility] [address] [phone]    [facility] Pending - Request Sent N/A [address] [facility] [address] [phone]    [facility] Pending - Request Sent N/A [address] [facility] [address] [phone]    [facility] Pending - Request Sent N/A [address] [facility] [address] [phone]      Durable Medical Equipment     No service coordination in this encounter.      Dialysis/Infusion     No service coordination in this encounter.      Home Medical Care     No service coordination in this encounter.      Social Care     No service coordination in this encounter.                Demographic Summary    No documentation.            Functional Status    No documentation.           Psychosocial    No documentation.           Abuse/Neglect    No documentation.           Legal    No documentation.           Substance Abuse    No documentation.           Patient Forms    No documentation.         Juliet Cruz RN

## 2018-10-08 NOTE — PLAN OF CARE
Problem: Patient Care Overview  Goal: Plan of Care Review   10/08/18 3452   Coping/Psychosocial   Plan of Care Reviewed With patient   OTHER   Outcome Summary Less assist required for all mobility although unable to progress ambulation distance. Agitated at times. Will continue to progress as able.

## 2018-10-08 NOTE — PLAN OF CARE
Problem: Fall Risk (Adult)  Goal: Identify Related Risk Factors and Signs and Symptoms  Outcome: Outcome(s) achieved Date Met: 10/08/18    Goal: Absence of Fall  Outcome: Ongoing (interventions implemented as appropriate)      Problem: Patient Care Overview  Goal: Plan of Care Review  Outcome: Ongoing (interventions implemented as appropriate)   10/08/18 0315   Coping/Psychosocial   Plan of Care Reviewed With patient   OTHER   Outcome Summary VSS; patient didn't report pain, nausea, or SOA. Resting comfortably. Will continue to monitor       Problem: Skin Injury Risk (Adult)  Goal: Identify Related Risk Factors and Signs and Symptoms  Outcome: Outcome(s) achieved Date Met: 10/08/18    Goal: Skin Health and Integrity  Outcome: Ongoing (interventions implemented as appropriate)

## 2018-10-08 NOTE — THERAPY TREATMENT NOTE
Acute Care - Physical Therapy Treatment Note  [facility]     Patient Name: Herbert Harris  : 1929  MRN: [id number]  Today's Date: 10/8/2018  Onset of Illness/Injury or Date of Surgery: 10/05/18  Date of Referral to PT: 10/06/18  Referring Physician: TOSHIA Ybarra M.D.    Admit Date: 10/5/2018    Visit Dx:    ICD-10-CM ICD-9-CM   1. Atrial fibrillation with RVR (CMS/HCC) I48.91 427.31   2. Acute on chronic diastolic congestive heart failure (CMS/HCC) I50.33 428.33     428.0   3. Anemia, unspecified type D64.9 285.9   4. Caregiver not readily available Z74.8 V60.89     Patient Active Problem List   Diagnosis   • Generalized abdominal pain   • Atrial fibrillation with RVR (CMS/HCC)   • Acute diastolic congestive heart failure (CMS/HCC)   • Cognitive impairment   • Stage 4 chronic kidney disease (CMS/HCC)   • Elevated troponin   • Peripheral vascular disease (CMS/HCC)   • Cancer (CMS/HCC), prostate   • Underweight BMI 18.5       Therapy Treatment          Rehabilitation Treatment Summary     Row Name 10/08/18 1430             Treatment Time/Intention    Discipline physical therapist  -MA      Document Type therapy note (daily note)  -MA      Subjective Information complains of   agitated about nsg staff  -MA      Mode of Treatment physical therapy  -MA      Patient/Family Observations Supine in bed with HOB elevated, exit alarm on, no acute distress noted at rest  -MA      Care Plan Review patient/other agree to care plan  -MA      Therapy Frequency (PT Clinical Impression) daily  -MA      Patient Effort good  -MA      Recorded by [MA] Lita Kellogg, PT 10/08/18 1434      Row Name 10/08/18 1430             Cognitive Assessment/Intervention- PT/OT    Orientation Status (Cognition) oriented x 4  -MA      Safety Deficit (Cognitive) mild deficit  -MA      Personal Safety Interventions fall prevention program maintained;gait belt;nonskid shoes/slippers when out of bed  -MA      Recorded by [MA] Lita Kellogg,  PT 10/08/18 1434      Row Name 10/08/18 1430             Safety Issues, Functional Mobility    Safety Issues Affecting Function (Mobility) positioning of assistive device;safety precaution awareness;safety precautions follow-through/compliance  -MA      Impairments Affecting Function (Mobility) strength;endurance/activity tolerance;shortness of breath  -MA      Recorded by [MA] Lita Kellogg, PT 10/08/18 1434      Row Name 10/08/18 1430             Bed Mobility Assessment/Treatment    Supine-Sit [place] (Bed Mobility) minimum assist (75% patient effort);verbal cues;nonverbal cues (demo/gesture)  -MA      Sit-Supine [place] (Bed Mobility) not tested  -MA      Bed Mobility, Safety Issues decreased use of legs for bridging/pushing;impaired trunk control for bed mobility  -MA      Assistive Device (Bed Mobility) bed rails;head of bed elevated  -MA      Recorded by [MA] Lita Kellogg, PT 10/08/18 1434      Row Name 10/08/18 1430             Transfer Assessment/Treatment    Transfer Assessment/Treatment sit-stand transfer;stand-sit transfer  -MA      Recorded by [MA] Lita Kellogg, PT 10/08/18 1434      Row Name 10/08/18 1430             Sit-Stand Transfer    Sit-Stand [place] (Transfers) contact guard;minimum assist (75% patient effort);verbal cues  -MA      Assistive Device (Sit-Stand Transfers) walker, front-wheeled  -MA      Recorded by [MA] Lita Kellogg, PT 10/08/18 1434      Row Name 10/08/18 1430             Stand-Sit Transfer    Stand-Sit [place] (Transfers) contact guard;verbal cues  -MA      Assistive Device (Stand-Sit Transfers) walker, front-wheeled  -MA      Recorded by [MA] Lita Kellogg, PT 10/08/18 1434      Row Name 10/08/18 1430             Gait/Stairs Assessment/Training    Gait/Stairs Assessment/Training gait/ambulation independence  -MA      [place] Level (Gait) contact guard;verbal cues  -MA      Assistive Device (Gait) walker, front-wheeled   -MA      Distance in Feet (Gait) 65  -MA      Pattern (Gait) step-to  -MA2      Deviations/Abnormal Patterns (Gait) base of support, narrow;jennifer decreased;gait speed decreased  -MA2      Comment (Gait/Stairs) Distance limited 2' to fatigue, ambulates well with RW  -MA2      Recorded by [MA] Lita Kellogg, PT 10/08/18 1434  [MA2] Lita Kellogg, PT 10/08/18 1436      Row Name 10/08/18 1430             Positioning and Restraints    Pre-Treatment Position in bed  -MA      Post Treatment Position chair  -MA      In Chair sitting;call light within reach;encouraged to call for assist;exit alarm on  -MA      Recorded by [MA] Lita Kellogg, PT 10/08/18 1436      Row Name                Wound 10/05/18 0830 coccyx pressure injury    Wound - Properties Group Date first assessed: 10/05/18 [HC] Time first assessed: 0830 [HC] Present On Admission : yes [HC] Location: coccyx [HC] Type: pressure injury [HC] Recorded by:  [HC] Analia Christensen RN 10/05/18 1734    Row Name 10/08/18 1430             Plan of Care Review    Plan of Care Reviewed With patient  -MA      Recorded by [MA] Lita Kellogg, PT 10/08/18 1436      Row Name 10/08/18 1430             Outcome Summary/Treatment Plan (PT)    Daily Summary of Progress (PT) progress toward functional goals is gradual  -MA      Anticipated Discharge Disposition (PT) home with home health;skilled nursing facility   pending patient progress made  -MA      Recorded by [MA] Lita Kellogg, PT 10/08/18 1436        User Key  (r) = Recorded By, (t) = Taken By, (c) = Cosigned By    Initials Name Effective Dates Discipline    MA Lita Kellogg, PT 04/03/18 -  PT    HC Analia Christensen RN 01/21/17 -  Nurse          Wound 10/05/18 0830 coccyx pressure injury (Active)   Dressing Appearance dry;intact 10/8/2018  2:53 AM   Closure KATARZYNA 10/8/2018  8:26 AM   Base dressing in place, unable to visualize 10/8/2018  8:26 AM   Drainage Amount none 10/8/2018  2:53 AM              Physical Therapy Education     Title: PT OT SLP Therapies (Done)     Topic: Physical Therapy (Done)     Point: Mobility training (Done)    Learning Progress Summary     Learner Status Readiness Method Response Comment Documented by    Patient Done Acceptance E VU  MA 10/08/18 1437     Done Acceptance E VU   10/07/18 0939          Point: Home exercise program (Done)    Learning Progress Summary     Learner Status Readiness Method Response Comment Documented by    Patient Done Acceptance E VU  MA 10/08/18 1437     Done Acceptance E VU  MP 10/07/18 0939          Point: Body mechanics (Done)    Learning Progress Summary     Learner Status Readiness Method Response Comment Documented by    Patient Done Acceptance E VU  MA 10/08/18 1437     Done Acceptance E VU  MP 10/07/18 0939          Point: Precautions (Done)    Learning Progress Summary     Learner Status Readiness Method Response Comment Documented by    Patient Done Acceptance E VU  MA 10/08/18 1437     Done Acceptance E VU   10/07/18 0939                      User Key     Initials Effective Dates Name Provider Type Discipline     06/22/16 -  Simón Perry, PT Physical Therapist PT    MA 04/03/18 -  Lita Kellogg, PT Physical Therapist PT                    PT Recommendation and Plan  Anticipated Discharge Disposition (PT): home with home health, skilled nursing facility (pending patient progress made)  Therapy Frequency (PT Clinical Impression): daily  Outcome Summary/Treatment Plan (PT)  Daily Summary of Progress (PT): progress toward functional goals is gradual  Anticipated Discharge Disposition (PT): home with home health, skilled nursing facility (pending patient progress made)  Plan of Care Reviewed With: patient  Outcome Summary: Less assist required for all mobility although unable to progress ambulation distance. Agitated at times. Will continue to progress as able.          Outcome Measures     Row Name 10/08/18 1400 10/07/18 0900           How much help from another person do you currently need...    Turning from your back to your side while in flat bed without using bedrails? 3  -MA 3  -MP     Moving from lying on back to sitting on the side of a flat bed without bedrails? 3  -MA 3  -MP     Moving to and from a bed to a chair (including a wheelchair)? 3  -MA 3  -MP     Standing up from a chair using your arms (e.g., wheelchair, bedside chair)? 3  -MA 3  -MP     Climbing 3-5 steps with a railing? 2  -MA 2  -MP     To walk in hospital room? 3  -MA 3  -MP     AM-PAC 6 Clicks Score 17  -MA 17  -MP        Functional Assessment    Outcome Measure Options AM-PAC 6 Clicks Basic Mobility (PT)  -MA AM-PAC 6 Clicks Basic Mobility (PT)  -MP       User Key  (r) = Recorded By, (t) = Taken By, (c) = Cosigned By    Initials Name Provider Type    Simón Camacho, PT Physical Therapist    Lita Meredith, PT Physical Therapist           Time Calculation:         PT Charges     Row Name 10/08/18 1429 10/08/18 0814          Time Calculation    Start Time 1400  -MA  --     Stop Time 1413  -MA  --     Time Calculation (min) 13 min  -MA  --     PT Received On 10/08/18  -MA 10/07/18  -MA     PT - Next Appointment 10/09/18  -MA 10/08/18  -MA     PT Goal Re-Cert Due Date  -- 10/14/18  -MA        Time Calculation- PT    Total Timed Code Minutes- PT 11 minute(s)  -MA  --       User Key  (r) = Recorded By, (t) = Taken By, (c) = Cosigned By    Initials Name Provider Type    Lita Meredith, PT Physical Therapist        Therapy Suggested Charges     Code   Minutes Charges    60001 (CPT®) Hc Pt Neuromusc Re Education Ea 15 Min      04844 (CPT®) Hc Pt Ther Proc Ea 15 Min      99286 (CPT®) Hc Gait Training Ea 15 Min 15 1    66844 (CPT®) Hc Pt Therapeutic Act Ea 15 Min      72171 (CPT®) Hc Pt Manual Therapy Ea 15 Min      87704 (CPT®) Hc Pt Iontophoresis Ea 15 Min      63010 (CPT®) Hc Pt Elec Stim Ea-Per 15 Min      00238 (CPT®) Hc Pt Ultrasound Ea 15 Min       23305 (CPT®) Hc Pt Self Care/Mgmt/Train Ea 15 Min      40356 (CPT®) Hc Pt Prosthetic (S) Train Initial Encounter, Each 15 Min      91580 (CPT®) Hc Pt Orthotic(S)/Prosthetic(S) Encounter, Each 15 Min      72146 (CPT®) Hc Orthotic(S) Mgmt/Train Initial Encounter, Each 15min      Total  15 1        Therapy Charges for Today     Code Description Service Date Service Provider Modifiers Qty    72338608660 HC PT THER PROC EA 15 MIN 10/8/2018 Lita Kellogg, PT GP 1          PT G-Codes  Outcome Measure Options: AM-PAC 6 Clicks Basic Mobility (PT)  AM-PAC 6 Clicks Score: 17    Lita Kellogg, PT  10/8/2018

## 2018-10-08 NOTE — NURSING NOTE
CWOCN consult for present on admission pressure injury- stage 2 with small amount of yellow fibrin over wound bed and pink moist tissue.     10/08/18 1335   Wound 10/05/18 0830 coccyx pressure injury   Date first assessed/Time first assessed: 10/05/18 0830   Present On Admission : yes  Location: coccyx  Type: pressure injury   Dressing Appearance dressing loose   Base moist;pink;yellow  (yellow fibrin)   Periwound dry;redness;blanchable   Periwound Temperature warm   Periwound Skin Turgor soft   Edges open   Wound Length (cm) 1 cm   Wound Width (cm) 0.4 cm   Wound Depth (cm) 0.1 cm   Drainage Amount none   Dressing Care, Wound dressing changed;border dressing;silicone   Periwound Care, Wound dry periwound area maintained

## 2018-10-08 NOTE — PROGRESS NOTES
Name: Herbert Harris ADMIT: 10/5/2018   : 1929  PCP: Provider, No Known    MRN: [id number] LOS: 3 days   AGE/SEX: 89 y.o. male  ROOM: [facility]   Subjective   No complaints other than is somewhat irritated with being in the hospital in general.    Objective   Vital Signs  Temp:  [97.3 °F (36.3 °C)-98.1 °F (36.7 °C)] 98 °F (36.7 °C)  Heart Rate:  [] 99  Resp:  [16-18] 18  BP: ()/(56-75) 141/69  SpO2:  [93 %-98 %] 93 %  on   ;   Device (Oxygen Therapy): room air  Body mass index is 18.49 kg/m².    Physical Exam   Constitutional: He is oriented to person, place, and time. He appears cachectic. No distress.   Cardiovascular: Normal rate.  An irregularly irregular rhythm present.   No murmur heard.  Pulmonary/Chest: Effort normal and breath sounds normal.   Abdominal: Soft. Bowel sounds are normal. He exhibits no distension. There is no tenderness.   Musculoskeletal: Normal range of motion. He exhibits no edema.   Neurological: He is alert and oriented to person, place, and time.   Skin: Skin is warm and dry. He is not diaphoretic.       Results Review:       I reviewed the patient's new clinical results.    Results from last 7 days  Lab Units 10/07/18  0756 10/06/18  0538 10/05/18  0325   WBC 10*3/mm3 5.26 4.40* 4.43*   HEMOGLOBIN g/dL 8.2* 8.3* 8.8*   PLATELETS 10*3/mm3 65* 53* 61*     Results from last 7 days  Lab Units 10/07/18  0756 10/06/18  0538 10/05/18  0325   SODIUM mmol/L 136 139 136   POTASSIUM mmol/L 5.1 5.3* 5.0   CHLORIDE mmol/L 102 104 101   CO2 mmol/L 21.5* 21.6* 20.1*   BUN mg/dL 65* 59* 56*   CREATININE mg/dL 3.19* 2.95* 2.96*   GLUCOSE mg/dL 90 83 123*   Estimated Creatinine Clearance: 11.2 mL/min (A) (by C-G formula based on SCr of 3.19 mg/dL (H)).  Results from last 7 days  Lab Units 10/07/18  0756 10/06/18  0538 10/05/18  0325   CALCIUM mg/dL 8.7 9.0 9.2   ALBUMIN g/dL  --   --  2.90*   MAGNESIUM mg/dL  --   --  2.0         amLODIPine 10 mg Oral Daily   atenolol 25 mg Oral Daily    bicalutamide 50 mg Oral Daily   haloperidol 0.5 mg Oral 4x Daily   hydrALAZINE 25 mg Oral BID   sennosides-docusate sodium 2 tablet Oral Nightly   sodium chloride 3 mL Intravenous Q12H      Diet Regular; Cardiac      Assessment/Plan      Active Hospital Problems    Diagnosis Date Noted   • **Atrial fibrillation with RVR (CMS/HCC) [I48.91] 10/05/2018   • Underweight BMI 17.7 [R63.6] 10/07/2018   • Cancer (CMS/[facility]), prostate [C80.1] 10/06/2018   • Generalized abdominal pain [R10.84] 10/05/2018   • Acute diastolic congestive heart failure (CMS/HCC) [I50.31] 10/05/2018   • Cognitive impairment [R41.89] 10/05/2018   • Stage 4 chronic kidney disease (CMS/[facility]) [N18.4] 10/05/2018   • Elevated troponin [R74.8] 10/05/2018   • Peripheral vascular disease (CMS/[facility]) [I73.9] 10/05/2018      Resolved Hospital Problems    Diagnosis Date Noted Date Resolved   No resolved problems to display.       Afib with RVR  - HR better  - Volume status seems improved after Lasix.  - not on AC as outpatient, would likely be a poor candidate     Abdominal Pain  - probably  related give prostate cancer; elevated AP and AST noted   - non-contrasted abdominal CT noted. Discussed results with daughter who is aware of his metastatic disease.     CKD  - unknown baseline-[facility]  - renal u/s without obstruction     PVD  - s/p surgical intervention on LLE from Dr. Barrios   - no evidence of LE ischemia currently     Cognitive Impairment  - I do not know his baseline, but apparently he did require outside help from his neighbors      ** Awaiting placement. Discussed with his daughter. She confirms he is [facility] due to metastatic prostate cancer and does not want further evaluation or treatment for this.        Robert Ortiz MD  [facility]  10/08/18  9:47 AM

## 2018-10-08 NOTE — DISCHARGE SUMMARY
"       Name: Herbert Harris  Age: 89 y.o.  Sex: male  :  1929  MRN: [id number]         Primary Care Physician: Provider, No Known      Date of Admission:  10/5/2018  Date of Discharge:  10/8/2018      Chief Complaint  Fall (Fall occuring from couch this morning.  Pt reports that the person who \"usually checks on him is unable to check on him anymore.\"  Pt denies SI/HI at this time.  No complaints at this time.  )      Discharge Diagnoses  Active Hospital Problems    Diagnosis Date Noted   • **Atrial fibrillation with RVR (CMS/HCC) [I48.91] 10/05/2018   • Underweight BMI 18.5 [R63.6] 10/07/2018   • Cancer (CMS/HCC), prostate [C80.1] 10/06/2018   • Generalized abdominal pain [R10.84] 10/05/2018   • Acute diastolic congestive heart failure (CMS/HCC) [I50.31] 10/05/2018   • Cognitive impairment [R41.89] 10/05/2018   • Stage 4 chronic kidney disease (CMS/HCC) [N18.4] 10/05/2018   • Elevated troponin [R74.8] 10/05/2018   • Peripheral vascular disease (CMS/HCC) [I73.9] 10/05/2018      Resolved Hospital Problems    Diagnosis Date Noted Date Resolved   No resolved problems to display.       Secondary Diagnoses  Past Medical History:   Diagnosis Date   • Atrial fibrillation (CMS/HCC)    • Cancer (CMS/HCC)     prostate with mets   • Cognitive impairment    • Hearing loss    • Normocytic normochromic anemia    • Spinal stenosis of lumbar region    • Spondylolisthesis at L5-S1 level        Consults  None      Procedures Performed  Imaging Results (all)     Procedure Component Value Units Date/Time    US Renal Bilateral [[id number]] Collected:  10/05/18 1821     Updated:  10/05/18 1827    Narrative:       RENAL ULTRASOUND     HISTORY: Acute renal failure.     FINDINGS: Both kidneys are within normal limits in size, the right  measuring 9.1 cm in length and the left measuring 9.5 cm. There is  slight generalized cortical thinning present. There is no evidence of  renal obstruction. The urinary bladder has a smooth contour.    "    CT Head Without Contrast [[id number]] Collected:  10/05/18 1330     Updated:  10/05/18 1621    Narrative:       Impression:          1. There is moderate small vessel disease in cerebral white matter, mild  diffuse cerebral atrophy and there are calcified atherosclerotic plaques  in the intracranial segment distal right vertebral artery and cavernous  segments of internal carotid arteries bilaterally.  2. The remainder of the head CT is within normal limits with no acute  intracranial abnormality seen. If the patient has focal or unilateral  weakness or any symptoms suggesting acute stroke, an MRI of the brain  could be obtained for a more complete assessment.          CT Abdomen Pelvis Without Contrast [[id number]] Collected:  10/05/18 1426     Updated:  10/05/18 1426    Narrative:       Impression:       1. The liver demonstrates a mildly nodular morphology that is concerning  for chronic liver disease and cirrhosis. Heterogenous lesion not  characterized on the current study is most concerning for a primary or  secondary hepatic malignancy. Perihepatic ascites is identified.  2. Numerous bone lesions that are concerning for metastatic disease.  There is also a nodular density measuring up to 1.7 cm seen within the  left anterior hemipelvis that is concerning for a metastatic implant.  3. Advanced atherosclerotic change with evidence of chronic intimal  dissection within the infrarenal aorta. There is significant  calcification seen in the region of the bilateral common femoral  arteries causing severe/near complete occlusion.  4. Bilateral moderate layering pleural effusions partly imaged with  bibasal atelectasis.                   Hospital Course  Mr. Harris is a 89 y.o. non-smoker with a history of cognitive impairment, Afib and metastatic prostate cancer who presented to [facility] initially complaining of a fall at his home. Please see the admitting history and physical for further details. He  was found to have rapid atrial fibrillation and CHF and was admitted to the hospital for further evaluation and treatment. He was treated with IV lasix and beta blocker for controlling of his heart rate. He has poor renal function but Cr has remained relatively stable. His biggest issues are social. He lives alone and doesn't have anyone regularly checking on him. He was with [facility] prior to admission but it has been decided at this time he needs more BORIS and will be transferred to a SNU facility this evening. He will forego [facility] for the immediate future but revisit when done with rehab. I have discussed the plan of care with his daughter, Kendra. They are aware of his advanced metastatic prostate cancer. Reviewed findings on CT scan with her. They do not want further evaluation or treatment.        Physical Exam  Temp:  [97.3 °F (36.3 °C)-98.1 °F (36.7 °C)] 97.9 °F (36.6 °C)  Heart Rate:  [] 97  Resp:  [16-18] 16  BP: ()/(60-81) 121/81  Body mass index is 18.49 kg/m².  Physical Exam  Constitutional: He is oriented to person, place, and time. He appears cachectic. No distress.   Cardiovascular: Normal rate.  An irregularly irregular rhythm present.   No murmur heard.  Pulmonary/Chest: Effort normal and breath sounds normal.   Abdominal: Soft. Bowel sounds are normal. He exhibits no distension. There is no tenderness.   Musculoskeletal: Normal range of motion. He exhibits no edema.   Neurological: He is alert and oriented to person, place, and time.   Skin: Skin is warm and dry. He is not diaphoretic.      Condition on Discharge  Stable.      Discharge Disposition  [facility]      Allergies  No Known Allergies      Discharge Medications     Your medication list      CHANGE how you take these medications      Instructions Last Dose Given Next Dose Due   HYDROcodone-acetaminophen 5-325 MG per tablet  Commonly known as:  NORCO  What changed:  reasons to take this       Take 1 tablet by mouth Every 4 (Four) Hours As Needed for Moderate Pain .          CONTINUE taking these medications      Instructions Last Dose Given Next Dose Due   ALPRAZolam 0.25 MG tablet  Commonly known as:  XANAX      Take 1 tablet by mouth 2 (Two) Times a Day As Needed for Anxiety.       amLODIPine 10 MG tablet  Commonly known as:  NORVASC      Take 10 mg by mouth Daily.       atenolol 25 MG tablet  Commonly known as:  TENORMIN      Take 25 mg by mouth Daily.       bicalutamide 50 MG chemo tablet  Commonly known as:  CASODEX      Take 50 mg by mouth Daily.       bisacodyl 10 MG suppository  Commonly known as:  DULCOLAX      Insert 10 mg into the rectum At Night As Needed for Constipation.       Cholecalciferol 1000 units tablet      Take 1,000 Units by mouth Daily.       fluticasone 50 MCG/ACT nasal spray  Commonly known as:  FLONASE      2 sprays into the nostril(s) as directed by provider Daily.       haloperidol 0.5 MG tablet  Commonly known as:  HALDOL      Take 0.5 mg by mouth 4 (Four) Times a Day. [facility] .... Take one tablet by mouth every hour as needed for nausea/vomiting, restlessness       hydrALAZINE 25 MG tablet  Commonly known as:  APRESOLINE      Take 25 mg by mouth 2 (Two) Times a Day.       MULTIVITAMIN ADULT PO      Take 1 tablet by mouth Daily.       sennosides-docusate sodium 8.6-50 MG tablet  Commonly known as:  SENOKOT-S      Take 2 tablets by mouth Every Night.             Where to Get Your Medications      You can get these medications from any pharmacy    Bring a paper prescription for each of these medications  · ALPRAZolam 0.25 MG tablet  · HYDROcodone-acetaminophen 5-325 MG per tablet       Diet Instructions     Diet: Regular, Cardiac       Discharge Diet:   Regular  Cardiac            Activity Instructions     Activity as Tolerated         No future appointments.  Follow-up Information     Provider, No Known Follow up in 1 week(s).    Contact information:  [facility]  [facility] [address]  [phone]                   Test Results Pending at Discharge  None       Code Status  Code Status and Medical Interventions:   Ordered at: 10/08/18 0951     Level Of Support Discussed With:    Health Care Surrogate    Patient     Code Status:    No CPR     Medical Interventions (Level of Support Prior to Arrest):    Comfort Measures           Robert Ortiz MD  [facility]  10/08/18  3:04 PM      Time: greater than 30 minutes.

## 2018-10-08 NOTE — PROGRESS NOTES
[facility] Daily Visit Report    Patient signed revocation form for  [facility] services on this date due to discharge to skilled rehab.     Latoya Guallpa LCSW  Licensed Clinical

## 2018-10-31 PROBLEM — D69.6 THROMBOCYTOPENIA (HCC): Chronic | Status: ACTIVE | Noted: 2018-01-01

## 2018-10-31 PROBLEM — G93.41 METABOLIC ENCEPHALOPATHY: Status: ACTIVE | Noted: 2018-01-01

## 2018-10-31 PROBLEM — D64.9 ANEMIA: Chronic | Status: ACTIVE | Noted: 2018-01-01

## 2018-10-31 PROBLEM — N17.9 AKI (ACUTE KIDNEY INJURY) (HCC): Status: ACTIVE | Noted: 2018-01-01

## 2018-10-31 PROBLEM — R29.6 FALLS FREQUENTLY: Chronic | Status: ACTIVE | Noted: 2018-01-01

## 2018-10-31 PROBLEM — N39.0 ACUTE UTI: Status: ACTIVE | Noted: 2018-01-01

## 2018-10-31 PROBLEM — Z66 DNR (DO NOT RESUSCITATE): Chronic | Status: ACTIVE | Noted: 2018-01-01

## 2018-10-31 PROBLEM — E87.5 HYPERKALEMIA: Status: ACTIVE | Noted: 2018-01-01

## 2018-10-31 PROBLEM — I50.32 CHRONIC DIASTOLIC CONGESTIVE HEART FAILURE (HCC): Chronic | Status: ACTIVE | Noted: 2018-01-01

## 2018-11-01 PROBLEM — Z51.5 ENCOUNTER FOR PALLIATIVE CARE: Status: ACTIVE | Noted: 2018-01-01

## 2018-11-01 NOTE — CONSULTS
FIRST UROLOGY CONSULT      Patient Identification:  NAME:  Herbert Harris  Age:  89 y.o.   Sex:  male   :  1929   MRN:  [id number]       Chief complaint: Difficult catheter placement    History of present illness:  89M admitted for falls and ROMAN with concern for UTI. The nursing staff attempted ly catheter placement without success on several tries. A urology consultation was requested.      Past medical history:  Past Medical History:   Diagnosis Date   • Atrial fibrillation (CMS/HCC)    • Cancer (CMS/HCC)     prostate with mets   • Cognitive impairment    • Hearing loss    • Normocytic normochromic anemia    • Spinal stenosis of lumbar region    • Spondylolisthesis at L5-S1 level        Past surgical history:  History reviewed. No pertinent surgical history.    Allergies:  Patient has no known allergies.    Home medications:  Prescriptions Prior to Admission   Medication Sig Dispense Refill Last Dose   • ALPRAZolam (XANAX) 0.25 MG tablet Take 1 tablet by mouth 2 (Two) Times a Day As Needed for Anxiety. (Patient taking differently: Take 0.5 mg by mouth Every 8 (Eight) Hours As Needed for Anxiety.) 10 tablet 0    • amLODIPine (NORVASC) 10 MG tablet Take 10 mg by mouth Daily.      • atenolol (TENORMIN) 25 MG tablet Take 25 mg by mouth Daily.      • bicalutamide (CASODEX) 50 MG chemo tablet Take 50 mg by mouth Daily.      • bisacodyl (DULCOLAX) 10 MG suppository Insert 10 mg into the rectum At Night As Needed for Constipation.      • Cholecalciferol 1000 units tablet Take 1,000 Units by mouth Daily.      • fluticasone (FLONASE) 50 MCG/ACT nasal spray 2 sprays into the nostril(s) as directed by provider Daily.      • hydrALAZINE (APRESOLINE) 25 MG tablet Take 25 mg by mouth 2 (Two) Times a Day.      • HYDROcodone-acetaminophen (NORCO) 5-325 MG per tablet Take 1 tablet by mouth Every 4 (Four) Hours As Needed for Moderate Pain . 10 tablet 0    • Multiple Vitamins-Minerals (MULTIVITAMIN ADULT PO) Take 1  tablet by mouth Daily.      • ondansetron (ZOFRAN) 4 MG tablet Take 4 mg by mouth Every 6 (Six) Hours As Needed for Nausea or Vomiting.      • sennosides-docusate sodium (SENOKOT-S) 8.6-50 MG tablet Take 2 tablets by mouth Every Night.      • haloperidol (HALDOL) 0.5 MG tablet Take 0.5 mg by mouth 4 (Four) Times a Day. Per [facility] .... Take one tablet by mouth every hour as needed for nausea/vomiting, restlessness           Hospital medications:    atenolol 25 mg Oral Daily   bicalutamide 50 mg Oral Daily   [START ON 2018] ceftriaxone 1 g Intravenous Q24H   sennosides-docusate sodium 2 tablet Oral Nightly   sodium chloride 3 mL Intravenous Q12H       sodium chloride 100 mL/hr Last Rate: 100 mL/hr (10/31/18 1542)     •  acetaminophen  •  ALPRAZolam  •  HYDROcodone-acetaminophen  •  Morphine  •  ondansetron **OR** ondansetron ODT **OR** ondansetron  •  sodium chloride  •  [COMPLETED] Insert peripheral IV **AND** sodium chloride    Family history:  History reviewed. No pertinent family history.    Social history:  Social History   Substance Use Topics   • Smoking status: Passive Smoke Exposure - Never Smoker   • Smokeless tobacco: Never Used   • Alcohol use No       Review of systems:    Negative 12-system ROS except for the following:  Unable to assess      Objective:  TMax 24 hours:   Temp (24hrs), Av.5 °F (35.8 °C), Min:96.1 °F (35.6 °C), Max:96.8 °F (36 °C)      Vitals Ranges:   Temp:  [96.1 °F (35.6 °C)-96.8 °F (36 °C)] 96.1 °F (35.6 °C)  Heart Rate:  [86-99] 97  Resp:  [16-18] 16  BP: ()/(52-73) 96/58    Intake/Output Last 3 shifts:  I/O last 3 completed shifts:  In: 1330 [I.V.:1280; IV Piggyback:50]  Out: -      Physical Exam:       General Appearance:    Alert, cooperative, in no acute distress                                       :    Testes descended bilaterally, no nodules.  Penis normal.  No scrotal or penile rashes noted                   Results review:   I reviewed the patient's new  clinical results.    Data review:  Lab Results (last 24 hours)     Procedure Component Value Units Date/Time    Urine Culture - Urine, Urine, Clean Catch [[id number]] Collected:  10/31/18 0357    Specimen:  Urine from Urine, Catheter Updated:  10/31/18 0448    Urinalysis With Microscopic If Indicated (No Culture) - Urine, Catheter [[id number]]  (Abnormal) Collected:  10/31/18 0357    Specimen:  Urine from Urine, Catheter Updated:  10/31/18 0427     Color, UA Green (A)     Comment: Any Substance that causes an abnormal urine color can alter the accuracy of the chemical reactions.        Appearance, UA Turbid (A)     pH, UA 6.0     Specific Gravity, UA 1.025     Glucose, UA Negative     Ketones, UA 15 mg/dL (1+) (A)     Bilirubin, UA Negative     Blood, UA Moderate (2+) (A)     Protein, UA >=300 mg/dL (3+) (A)     Leuk Esterase, UA Large (3+) (A)     Nitrite, UA Positive (A)     Urobilinogen, UA 0.2 E.U./dL    Narrative:       Urine was extremely turbid and results are obtained from spun sample.     Urinalysis, Microscopic Only - Urine, Clean Catch [[id number]]  (Abnormal) Collected:  10/31/18 0357    Specimen:  Urine from Urine, Catheter Updated:  10/31/18 0427     RBC, UA Too Numerous to Count (A) /HPF      WBC, UA Too Numerous to Count (A) /HPF      Bacteria, UA 4+ (A) /HPF      Squamous Epithelial Cells, UA       Unable to determine due to loaded field (A)     /HPF     Hyaline Casts, UA       Unable to determine due to loaded field     /LPF     Methodology Automated Microscopy    Comprehensive Metabolic Panel [[id number]]  (Abnormal) Collected:  10/31/18 0331    Specimen:  Blood Updated:  10/31/18 0409     Glucose 144 (H) mg/dL       (H) mg/dL      Creatinine 4.36 (H) mg/dL      Sodium 139 mmol/L      Potassium 5.6 (H) mmol/L      Chloride 105 mmol/L      CO2 17.9 (L) mmol/L      Calcium 9.3 mg/dL      Total Protein 6.2 g/dL      Albumin 3.00 (L) g/dL      ALT (SGPT) 21 U/L      AST (SGOT) 61 (H) U/L       Alkaline Phosphatase 685 (H) U/L      Total Bilirubin 0.6 mg/dL      eGFR Non African Amer 13 (L) mL/min/1.73      Comment: <15 Indicative of kidney failure.        eGFR   Amer -- mL/min/1.73      Comment: <15 Indicative of kidney failure.        Globulin 3.2 gm/dL      A/G Ratio 0.9 g/dL      BUN/Creatinine Ratio 28.7 (H)     Anion Gap 16.1 mmol/L     Narrative:       The MDRD GFR formula is only valid for adults with stable renal function between ages 18 and 70.    CBC & Differential [[id number]] Collected:  10/31/18 0331    Specimen:  Blood Updated:  10/31/18 0348    Narrative:       The following orders were created for panel order CBC & Differential.  Procedure                               Abnormality         Status                     ---------                               -----------         ------                     Scan Slide[[id number]]                                                                  CBC Auto Differential[[id number]]        Abnormal            Final result                 Please view results for these tests on the individual orders.    CBC Auto Differential [[id number]]  (Abnormal) Collected:  10/31/18 0331    Specimen:  Blood Updated:  10/31/18 0348     WBC 6.77 10*3/mm3      RBC 3.23 (L) 10*6/mm3      Hemoglobin 9.1 (L) g/dL      Hematocrit 28.5 (L) %      MCV 88.2 fL      MCH 28.2 pg      MCHC 31.9 (L) g/dL      RDW 18.2 (H) %      RDW-SD 55.6 (H) fl      MPV -- fL      Comment: Not calc        Platelets 46 (L) 10*3/mm3      Neutrophil % 86.2 (H) %      Lymphocyte % 6.2 (L) %      Monocyte % 7.4 %      Eosinophil % 0.1 (L) %      Basophil % 0.1 %      Immature Grans % 0.9 (H) %      Neutrophils, Absolute 5.83 10*3/mm3      Lymphocytes, Absolute 0.42 (L) 10*3/mm3      Monocytes, Absolute 0.50 10*3/mm3      Eosinophils, Absolute 0.01 10*3/mm3      Basophils, Absolute 0.01 10*3/mm3      Immature Grans, Absolute 0.06 (H) 10*3/mm3      nRBC 0.7 (H) /100 WBC            Imaging:  Imaging  Results (last 24 hours)     Procedure Component Value Units Date/Time    CT Head Without Contrast [[id number]] Collected:  10/31/18 0346     Updated:  10/31/18 0352    Narrative:       CT OF THE HEAD WITHOUT CONTRAST.     HISTORY: Posterior trauma and confusion following fall     COMPARISON: October 5, 2018     TECHNIQUE: Axial CT imaging was obtained from the vertex of the skull  through the skull base. No IV contrast was administered.     FINDINGS:  No acute intracranial hemorrhage is identified. Patient is noted to have  diffuse cerebral atrophy, with compensatory ventricular dilatation, in  keeping with the age of 89. There is periventricular and deep white  matter microangiopathic disease. There is no midline shift or mass  effect. There is some mucosal thickening identified within the maxillary  sinuses bilaterally, right greater than left. Mastoid air cells appear  clear. No calvarial fracture is seen.       Impression:       No acute traumatic injury identified.     Radiation dose reduction techniques were utilized, including automated  exposure control and exposure modulation based on body size.     This report was finalized on 10/31/2018 3:49 AM by Dr. Dottie Maurice M.D.                Assessment:       Acute UTI    Atrial fibrillation with RVR (CMS/HCC)    Cognitive impairment    Stage 4 chronic kidney disease (CMS/HCC)    Peripheral vascular disease (CMS/HCC)    Cancer (CMS/HCC), prostate    Underweight BMI 18.5    Falls frequently    ROMAN (acute kidney injury) (CMS/HCC)    Hyperkalemia    Thrombocytopenia (CMS/HCC)    Chronic diastolic congestive heart failure (CMS/HCC)    Metabolic encephalopathy    Anemia    DNR (do not resuscitate)        Plan:     - after informed consent was obtained, the patient was prepped and draped. A 16 Fr coude catheter was passed through the urethra and into the bladder without difficulty. Clear yellow urine returned.     Kelechi Gabriel Jr., MD  10/31/18  10:56  PM

## 2018-11-01 NOTE — PLAN OF CARE
Problem: Patient Care Overview  Goal: Plan of Care Review  Continue symptom management and comfort care   10/31/18 2155 11/01/18 0413   Coping/Psychosocial   Plan of Care Reviewed With patient --    Plan of Care Review   Progress --  no change   OTHER   Outcome Summary --  Patient had a restless night. Pain medication was 1mg morphine Q4 hours. Order obtained for 4mg Q4 hours. Patient continues to yell out but does not answer when asked what's wrong. IV fluids continue. No family at bedside.Meeting with [facility] in AM.

## 2018-11-01 NOTE — DISCHARGE SUMMARY
Date of Admission: 10/31/2018  Date of Discharge:  11/1/2018  Primary Care Physician: David Wood MD     Discharge Diagnosis:  Active Hospital Problems    Diagnosis Date Noted   • **Acute UTI [N39.0] 10/31/2018   • Encounter for palliative care [Z51.5] 11/01/2018   • Falls frequently [R29.6] 10/31/2018   • ROMAN (acute kidney injury) (CMS/HCC) [N17.9] 10/31/2018   • Hyperkalemia [E87.5] 10/31/2018   • Thrombocytopenia (CMS/HCC) [D69.6] 10/31/2018   • Chronic diastolic congestive heart failure (CMS/HCC) [I50.32] 10/31/2018   • Metabolic encephalopathy [G93.41] 10/31/2018   • Anemia [D64.9] 10/31/2018   • DNR (do not resuscitate) [Z66] 10/31/2018   • Underweight BMI 18.5 [R63.6] 10/07/2018   • Cancer (CMS/HCC), prostate [C80.1] 10/06/2018   • Atrial fibrillation with RVR (CMS/HCC) [I48.91] 10/05/2018   • Cognitive impairment [R41.89] 10/05/2018   • Peripheral vascular disease (CMS/HCC) [I73.9] 10/05/2018   • Stage 4 chronic kidney disease (CMS/HCC) [N18.4] 10/05/2018      Resolved Hospital Problems    Diagnosis Date Noted Date Resolved   No resolved problems to display.       Presenting Problem/History of Present Illness:  Acute UTI [N39.0]  Acute kidney injury (CMS/HCC) [N17.9]  Primary prostate cancer with metastasis from prostate to other site (CMS/HCC) [C61]  Contusion of right periocular region, initial encounter [S00.11XA]  Fall at nursing home, initial encounter [W19.XXXA, Y92.129]  Toxic metabolic encephalopathy [G92]     Hospital Course:  The patient is a 89 y.o. male with a history of cognitive impairment, Afib, peripheral vascular disease, CKD, and prostate cancer who presented with a fall at his NH.  He was found to have a UTI and urinary retention requiring ly catheter placement by urology.  He had recently been admitted to this facility for acute CHF and was discharged to rehab.  He had been a [facility] patient but family had decided to forego further services temporarily to receive rehab.  On  "this admission I discussed with his son and daughter his prognosis of which they are well aware.  They state that they no longer want to see him suffer and above all else want him to be comfortable.  They have decided to stop all treatments not directly related to comfort, including antibiotics.  [facility] met with the family and he will be transitioned to a hospice scattered bed.    Exam Today:  Blood pressure 116/56, pulse 103, temperature 98.9 °F (37.2 °C), temperature source Oral, resp. rate 20, height 162.6 cm (64\"), weight 49 kg (108 lb), SpO2 (!) 88 %.  Constitutional: No distress.  Cachectic  Head: Normocephalic. O/P dry, poor dentition   Eyes: Conjunctivae and EOM are normal.  Neck: No JVD present. No tracheal deviation present. No thyromegaly present.   Cardiovascular: Normal rate and intact distal pulses.  irreg irreg   Pulmonary/Chest: Effort normal and breath sounds normal. No stridor. No respiratory distress.   Abdominal: Soft. Bowel sounds are normal. He exhibits no distension. There is no tenderness.   Musculoskeletal: He exhibits no edema.   Lymphadenopathy:     He has no cervical adenopathy.   Neurological: lethargic, does not respond to verbal stimuli  Skin: Skin is dry. He is not diaphoretic.   Psychiatric: nonverbal   Nursing note and vitals reviewed.    Consults:   Consults     Date and Time Order Name Status Description    10/31/2018 1222 Inpatient Urology Consult Completed     10/31/2018 0454 [facility] (on-call MD unless specified) Completed     10/5/2018 0516 [facility] (on-call MD unless specified) Completed            Discharge Disposition: Hospice Scattered Bed  Hospice/Medical Facility ([facility])    Discharge Medications:     Discharge Medications      ASK your doctor about these medications      Instructions Start Date   ALPRAZolam 0.25 MG tablet  Commonly known as:  XANAX   0.25 mg, Oral, 2 Times Daily PRN      amLODIPine 10 MG tablet  Commonly known as:  NORVASC   10 mg, Oral, Daily   "    atenolol 25 MG tablet  Commonly known as:  TENORMIN   25 mg, Oral, Daily      bicalutamide 50 MG chemo tablet  Commonly known as:  CASODEX   50 mg, Oral, Daily      bisacodyl 10 MG suppository  Commonly known as:  DULCOLAX   10 mg, Rectal, Nightly PRN      Cholecalciferol 1000 units tablet   1,000 Units, Oral, Daily      fluticasone 50 MCG/ACT nasal spray  Commonly known as:  FLONASE   2 sprays, Nasal, Daily      haloperidol 0.5 MG tablet  Commonly known as:  HALDOL   0.5 mg, Oral, 4 Times Daily, [facility] .... Take one tablet by mouth every hour as needed for nausea/vomiting, restlessness       hydrALAZINE 25 MG tablet  Commonly known as:  APRESOLINE   25 mg, Oral, 2 Times Daily      HYDROcodone-acetaminophen 5-325 MG per tablet  Commonly known as:  NORCO   1 tablet, Oral, Every 4 Hours PRN      MULTIVITAMIN ADULT PO   1 tablet, Oral, Daily      ondansetron 4 MG tablet  Commonly known as:  ZOFRAN   4 mg, Oral, Every 6 Hours PRN      sennosides-docusate sodium 8.6-50 MG tablet  Commonly known as:  SENOKOT-S   2 tablets, Oral, Nightly             Discharge Diet: As tolerated    Activity at Discharge:  As tolerated    Follow-up Appointments:  No future appointments.      Test Results Pending at Discharge:   Order Current Status    Urine Culture - Urine, Urine, Clean Catch Preliminary result           Dexter Weems MD  11/01/18  11:35 AM    Time Spent on Discharge Activities: Greater than 30 minutes.

## 2018-11-01 NOTE — PROGRESS NOTES
Case Management Discharge Note    Final Note: The patient  on 18 @ 12:26. MARTINE Cruz RN, CCP.     Destination - Selection Complete     Service Request Status Selected Specialties Address Phone Number Fax Number    [facility] [address] [facility] [address] [phone]      Durable Medical Equipment     No service has been selected for the patient.      Dialysis/Infusion     No service has been selected for the patient.      Home Medical Care     No service has been selected for the patient.      Social Care     No service has been selected for the patient.             Final Discharge Disposition Code: 41 -  in medical facility

## 2018-11-01 NOTE — PROGRESS NOTES
Discharge Planning Assessment  [facility]     Patient Name: Herbert Harris  MRN: [id number]  Today's Date: 2018    Admit Date: 10/31/2018          Discharge Needs Assessment    No documentation.             Discharge Plan     Row Name 18 1046       Plan    Plan Comments Per patient's daughter the  home is [facility] and  home, [facility], [phone] for the body to be released to at death. MARTINE Cruz RN, CCP        Destination     Service Request Status Selected Specialties Address Phone Number Fax Number    [facility] - Request Sent N/A [address] [facility] [address] [phone]      Durable Medical Equipment     No service coordination in this encounter.      Dialysis/Infusion     No service coordination in this encounter.      Home Medical Care     No service coordination in this encounter.      Social Care     No service coordination in this encounter.                Demographic Summary    No documentation.           Functional Status    No documentation.           Psychosocial    No documentation.           Abuse/Neglect    No documentation.           Legal    No documentation.           Substance Abuse    No documentation.           Patient Forms    No documentation.         Juliet Cruz RN

## 2018-11-01 NOTE — PROGRESS NOTES
Case Management Discharge Note    Final Note: Admitted to a [facility] scattered bed on 11/1/18. MARTINE Cruz RN, CCP    Destination - Selection Complete     Service Request Status Selected Specialties Address Phone Number Fax Number    [facility] Selected Hospice [address], [facility] [id number] [phone]      Durable Medical Equipment     No service has been selected for the patient.      Dialysis/Infusion     No service has been selected for the patient.      Home Medical Care     No service has been selected for the patient.      Social Care     No service has been selected for the patient.             Final Discharge Disposition Code: 51 - hospice medical facility

## 2018-11-01 NOTE — SIGNIFICANT NOTE
11/01/18 1036   Rehab Time/Intention   Evaluation Not Performed (Per KAVITA Beckham; hoping to make pt palliative and recomends ST to s/o for now. If improvments, she will reorder ST. )   Rehab Treatment   Discipline speech language pathologist

## 2018-11-02 LAB — BACTERIA SPEC AEROBE CULT: ABNORMAL

## 2018-11-02 NOTE — DISCHARGE SUMMARY
Primary Care Physician: David Wood MD    Date of Admission:  2018  Date of Death:  2018  Time of Death:  12:26 PM    Cause of Death: Renal Failure     Final Diagnosis:  Active Hospital Problems    Diagnosis Date Noted   • Acute UTI [N39.0] 10/31/2018      Resolved Hospital Problems    Diagnosis Date Noted Date Resolved   No resolved problems to display.       Presenting Problem/History of Present Illness:  Acute UTI [N39.0]      Hospital Course  The Patient was a 89 y.o. male with a history of cognitive impairment, Afib, peripheral vascular disease, CKD, and prostate cancer who presented with a fall at his NH.  He was found to have a UTI and acute on chronic renal failure.  He did have urinary retention requiring ly catheter placement by urology.  He had recently been admitted to this facility for acute CHF and was discharged to rehab.  He had been a [facility] patient but family had decided to forego further services temporarily to receive rehab.  On this admission I discussed with his son and daughter his prognosis of which they were well aware, stating that they no longer wanted to see him suffer and above all else wanted him to be comfortable.  They decided to stop all treatments not directly related to comfort, including antibiotics.  [facility] met with the family and he was transitioned to a hospice scattered bed.  He rapidly declined and  on 18 at 1226.     Dexter Weems MD  18  6:32 PM